# Patient Record
Sex: FEMALE | Race: WHITE | NOT HISPANIC OR LATINO | Employment: FULL TIME | URBAN - NONMETROPOLITAN AREA
[De-identification: names, ages, dates, MRNs, and addresses within clinical notes are randomized per-mention and may not be internally consistent; named-entity substitution may affect disease eponyms.]

---

## 2023-10-17 ENCOUNTER — TELEPHONE (OUTPATIENT)
Dept: PRIMARY CARE | Facility: CLINIC | Age: 48
End: 2023-10-17
Payer: COMMERCIAL

## 2023-10-17 DIAGNOSIS — Z12.31 ENCOUNTER FOR SCREENING MAMMOGRAM FOR MALIGNANT NEOPLASM OF BREAST: Primary | ICD-10-CM

## 2023-10-17 NOTE — TELEPHONE ENCOUNTER
Patient left a message on the referral line requesting a referral for a mammogram, wants to go to [facility]

## 2023-10-31 ENCOUNTER — ANCILLARY PROCEDURE (OUTPATIENT)
Dept: RADIOLOGY | Facility: CLINIC | Age: 48
End: 2023-10-31
Payer: COMMERCIAL

## 2023-10-31 DIAGNOSIS — Z12.31 ENCOUNTER FOR SCREENING MAMMOGRAM FOR MALIGNANT NEOPLASM OF BREAST: ICD-10-CM

## 2023-10-31 PROCEDURE — 77067 SCR MAMMO BI INCL CAD: CPT | Performed by: RADIOLOGY

## 2023-10-31 PROCEDURE — 77063 BREAST TOMOSYNTHESIS BI: CPT

## 2023-10-31 PROCEDURE — 77063 BREAST TOMOSYNTHESIS BI: CPT | Performed by: RADIOLOGY

## 2024-02-06 ASSESSMENT — PROMIS GLOBAL HEALTH SCALE
EMOTIONAL_PROBLEMS: NEVER
RATE_GENERAL_HEALTH: EXCELLENT
CARRYOUT_SOCIAL_ACTIVITIES: VERY GOOD
RATE_QUALITY_OF_LIFE: EXCELLENT
RATE_SOCIAL_SATISFACTION: GOOD
RATE_MENTAL_HEALTH: VERY GOOD
RATE_AVERAGE_PAIN: 0
CARRYOUT_PHYSICAL_ACTIVITIES: COMPLETELY
RATE_PHYSICAL_HEALTH: VERY GOOD

## 2024-02-08 PROBLEM — N85.2 ENLARGED UTERUS: Status: ACTIVE | Noted: 2024-02-08

## 2024-02-08 PROBLEM — C50.919 BREAST CA (MULTI): Status: ACTIVE | Noted: 2024-02-08

## 2024-02-08 PROBLEM — L71.9 ROSACEA, UNSPECIFIED: Status: ACTIVE | Noted: 2021-05-04

## 2024-02-08 PROBLEM — Z90.10 HISTORY OF MASTECTOMY, TOTAL: Status: ACTIVE | Noted: 2024-02-08

## 2024-02-08 PROBLEM — R73.03 PREDIABETES: Status: ACTIVE | Noted: 2024-02-08

## 2024-02-08 PROBLEM — D18.01 HEMANGIOMA OF SKIN AND SUBCUTANEOUS TISSUE: Status: ACTIVE | Noted: 2021-05-04

## 2024-02-08 PROBLEM — C50.919 BREAST CA (MULTI): Chronic | Status: ACTIVE | Noted: 2024-02-08

## 2024-02-08 PROBLEM — R22.40 MASS OF LEG: Status: ACTIVE | Noted: 2024-02-08

## 2024-02-08 PROBLEM — K21.9 GERD (GASTROESOPHAGEAL REFLUX DISEASE): Status: ACTIVE | Noted: 2024-02-08

## 2024-02-08 PROBLEM — E04.1 THYROID NODULE: Status: ACTIVE | Noted: 2024-02-08

## 2024-02-08 PROBLEM — M54.12 CERVICAL RADICULOPATHY: Status: ACTIVE | Noted: 2024-02-08

## 2024-02-08 PROBLEM — D25.9 LEIOMYOMA OF UTERUS: Status: ACTIVE | Noted: 2024-02-08

## 2024-02-08 RX ORDER — OMEPRAZOLE 20 MG/1
CAPSULE, DELAYED RELEASE ORAL
COMMUNITY
Start: 2022-04-26 | End: 2024-02-13 | Stop reason: WASHOUT

## 2024-02-08 RX ORDER — CHOLECALCIFEROL (VITAMIN D3) 125 MCG
CAPSULE ORAL
COMMUNITY

## 2024-02-09 PROBLEM — Z00.00 WELL ADULT EXAM: Chronic | Status: ACTIVE | Noted: 2024-02-09

## 2024-02-13 ENCOUNTER — LAB (OUTPATIENT)
Dept: LAB | Facility: LAB | Age: 49
End: 2024-02-13
Payer: COMMERCIAL

## 2024-02-13 ENCOUNTER — OFFICE VISIT (OUTPATIENT)
Dept: PRIMARY CARE | Facility: CLINIC | Age: 49
End: 2024-02-13
Payer: COMMERCIAL

## 2024-02-13 VITALS
DIASTOLIC BLOOD PRESSURE: 75 MMHG | TEMPERATURE: 98.5 F | OXYGEN SATURATION: 98 % | HEIGHT: 66 IN | SYSTOLIC BLOOD PRESSURE: 121 MMHG | HEART RATE: 65 BPM | BODY MASS INDEX: 29.54 KG/M2 | WEIGHT: 183.8 LBS

## 2024-02-13 DIAGNOSIS — R73.03 PREDIABETES: Chronic | ICD-10-CM

## 2024-02-13 DIAGNOSIS — Z11.59 ENCOUNTER FOR HEPATITIS C SCREENING TEST FOR LOW RISK PATIENT: Chronic | ICD-10-CM

## 2024-02-13 DIAGNOSIS — C50.919 MALIGNANT NEOPLASM OF FEMALE BREAST, UNSPECIFIED ESTROGEN RECEPTOR STATUS, UNSPECIFIED LATERALITY, UNSPECIFIED SITE OF BREAST (MULTI): Chronic | ICD-10-CM

## 2024-02-13 DIAGNOSIS — E66.09 CLASS 1 OBESITY DUE TO EXCESS CALORIES WITH BODY MASS INDEX (BMI) OF 30.0 TO 30.9 IN ADULT, UNSPECIFIED WHETHER SERIOUS COMORBIDITY PRESENT: Chronic | ICD-10-CM

## 2024-02-13 DIAGNOSIS — Z13.220 SCREENING FOR CHOLESTEROL LEVEL: Chronic | ICD-10-CM

## 2024-02-13 DIAGNOSIS — Z90.10 HISTORY OF MASTECTOMY, TOTAL: Chronic | ICD-10-CM

## 2024-02-13 DIAGNOSIS — Z11.4 SCREENING FOR HIV (HUMAN IMMUNODEFICIENCY VIRUS): Chronic | ICD-10-CM

## 2024-02-13 DIAGNOSIS — Z12.11 SCREEN FOR COLON CANCER: Chronic | ICD-10-CM

## 2024-02-13 DIAGNOSIS — L71.9 ROSACEA: Chronic | ICD-10-CM

## 2024-02-13 DIAGNOSIS — Z00.00 WELL ADULT EXAM: Primary | Chronic | ICD-10-CM

## 2024-02-13 PROBLEM — E66.811 CLASS 1 OBESITY DUE TO EXCESS CALORIES WITH BODY MASS INDEX (BMI) OF 30.0 TO 30.9 IN ADULT: Chronic | Status: ACTIVE | Noted: 2024-02-13

## 2024-02-13 LAB
CHOLEST SERPL-MCNC: 175 MG/DL (ref 0–199)
CHOLESTEROL/HDL RATIO: 3.8
EST. AVERAGE GLUCOSE BLD GHB EST-MCNC: 100 MG/DL
HBA1C MFR BLD: 5.1 %
HCV AB SER QL: NONREACTIVE
HDLC SERPL-MCNC: 46.2 MG/DL
HIV 1+2 AB+HIV1 P24 AG SERPL QL IA: NONREACTIVE
LDLC SERPL CALC-MCNC: 101 MG/DL
NON HDL CHOLESTEROL: 129 MG/DL (ref 0–149)
TRIGL SERPL-MCNC: 137 MG/DL (ref 0–149)
VLDL: 27 MG/DL (ref 0–40)

## 2024-02-13 PROCEDURE — 80061 LIPID PANEL: CPT

## 2024-02-13 PROCEDURE — 83036 HEMOGLOBIN GLYCOSYLATED A1C: CPT

## 2024-02-13 PROCEDURE — 86803 HEPATITIS C AB TEST: CPT

## 2024-02-13 PROCEDURE — 99396 PREV VISIT EST AGE 40-64: CPT | Performed by: FAMILY MEDICINE

## 2024-02-13 PROCEDURE — 3008F BODY MASS INDEX DOCD: CPT | Performed by: FAMILY MEDICINE

## 2024-02-13 PROCEDURE — 36415 COLL VENOUS BLD VENIPUNCTURE: CPT

## 2024-02-13 PROCEDURE — 1036F TOBACCO NON-USER: CPT | Performed by: FAMILY MEDICINE

## 2024-02-13 PROCEDURE — 87389 HIV-1 AG W/HIV-1&-2 AB AG IA: CPT

## 2024-04-23 ENCOUNTER — OFFICE VISIT (OUTPATIENT)
Dept: DERMATOLOGY | Facility: CLINIC | Age: 49
End: 2024-04-23
Payer: COMMERCIAL

## 2024-04-23 DIAGNOSIS — L71.9 ROSACEA: Primary | ICD-10-CM

## 2024-04-23 DIAGNOSIS — Z12.83 ENCOUNTER FOR SCREENING FOR MALIGNANT NEOPLASM OF SKIN: ICD-10-CM

## 2024-04-23 DIAGNOSIS — D22.9 MELANOCYTIC NEVUS, UNSPECIFIED LOCATION: ICD-10-CM

## 2024-04-23 DIAGNOSIS — D18.01 HEMANGIOMA OF SKIN: ICD-10-CM

## 2024-04-23 DIAGNOSIS — L81.4 LENTIGO: ICD-10-CM

## 2024-04-23 PROCEDURE — 1036F TOBACCO NON-USER: CPT | Performed by: NURSE PRACTITIONER

## 2024-04-23 PROCEDURE — 99214 OFFICE O/P EST MOD 30 MIN: CPT | Performed by: NURSE PRACTITIONER

## 2024-04-23 PROCEDURE — 3008F BODY MASS INDEX DOCD: CPT | Performed by: NURSE PRACTITIONER

## 2024-04-23 RX ORDER — IVERMECTIN 10 MG/G
CREAM TOPICAL
Qty: 45 G | Refills: 5 | Status: SHIPPED | OUTPATIENT
Start: 2024-04-23

## 2024-04-23 NOTE — PROGRESS NOTES
Subjective     Karol Hutchinson is a 48 y.o. female who presents for the following: Skin Check (Patient presents to office today for FBSE. PMHX insignificant. ).     Review of Systems:  No other skin or systemic complaints other than what is documented elsewhere in the note.    The following portions of the chart were reviewed this encounter and updated as appropriate:   Tobacco  Allergies  Meds  Problems  Med Hx  Surg Hx  Fam Hx         Skin Cancer History  No skin cancer on file.      Specialty Problems          Dermatology Problems    Hemangioma of skin and subcutaneous tissue    Rosacea, unspecified    Rosacea        Objective   Well appearing patient in no apparent distress; mood and affect are within normal limits.    A focused skin examination was performed. All findings within normal limits unless otherwise noted below.    Assessment/Plan   1. Rosacea  Head - Anterior (Face)  Mid face erythema with telangiectasias and scattered inflammatory papules.    -Discussed nature of this chronic condition  -Recommend gentle skin care habits including gentle cleansers, non-comedogenic physical/mineral based sunscreen daily. Avoid exfoliation, wind and extreme temperatures when possible.  - Start Soolantra, use as directed.   - Discussed cosmetic treatment options, will explore her options.   - Risks, benefits, and side effects discussed. Patient understood and agrees with the plan.       Related Medications  ivermectin 1 % cream  Thin coat to entire face once daily.    2. Encounter for screening for malignant neoplasm of skin  Scattered benign lesions    - Protective measures, such as avoiding skin exposure to sunlight during peak sun hours (10 AM to 3 PM), wearing protective clothing, and applying high-SPF sunscreen, are essential for reducing exposure to harmful ultraviolet (UV) light.  - Monthly self-examination of the skin is helpful to detect new lesions or changes in existing lesions.  - Discussed signs  and symptoms of sun-related skin cancers.   - Make sure your moles are not signs of skin cancer (melanoma). Remember the ABCDEs of melanoma lesions:  A - Asymmetry: One half of the lesion does not mirror the other half.  B - Border: The borders are irregular or vague (indistinct).  C - Color: More than one color may be noted within the mole.  D - Diameter: Size greater than 6 mm (roughly the size of a pencil eraser) may be concerning.  E - Evolving: Notable changes in the lesion over time are suspicious signs for skin cancer.    3. Melanocytic nevus, unspecified location  Uniform pigmented macule(s)/papule(s) with reassuring findings on dermoscopy    -Discussed nature of condition  -Reassurance, benign-appearing features on examination today  -Recommend continued observation    4. Hemangioma of skin  Violaceous/red papule with maroon lagoons     - A cherry hemangioma is a small macule (small, flat, smooth area) or papule (small, solid bump) formed from an overgrowth of tiny blood vessels in the skin. Cherry hemangiomas are characteristically red or purplish in color. They often first appear in middle adulthood and usually increase in number with age. Cherry hemangiomas are noncancerous (benign) and are common in adults.  - Lesions are benign, reassured patient.     5. Lentigo  Scattered tan macules in sun-exposed areas.    A solar lentigo (plural, solar lentigines), sometimes called an age spot or liver spot, is a brown macule (small, flat, smooth area of skin) caused by chronic sun or artificial ultraviolet (UV) light exposure. There may be just one lentigo or there may be multiple. This type of lentigo is different from lentigo simplex (discussed separately) because it is caused by exposure to UV light. Solar lentigines are benign, but they do indicate excessive sun exposure, a risk factor for the development of skin cancer.  Lesions are benign, no treatment needed.

## 2024-05-07 ENCOUNTER — APPOINTMENT (OUTPATIENT)
Dept: DERMATOLOGY | Facility: CLINIC | Age: 49
End: 2024-05-07
Payer: COMMERCIAL

## 2024-05-22 ENCOUNTER — APPOINTMENT (OUTPATIENT)
Dept: DERMATOLOGY | Facility: CLINIC | Age: 49
End: 2024-05-22
Payer: COMMERCIAL

## 2024-09-18 ENCOUNTER — TELEPHONE (OUTPATIENT)
Dept: PRIMARY CARE | Facility: CLINIC | Age: 49
End: 2024-09-18
Payer: COMMERCIAL

## 2024-09-18 DIAGNOSIS — Z12.31 ENCOUNTER FOR SCREENING MAMMOGRAM FOR MALIGNANT NEOPLASM OF BREAST: Primary | ICD-10-CM

## 2024-09-18 NOTE — TELEPHONE ENCOUNTER
Patient left a message on the referral line requesting an order for a mammogram, wants STACY Meng or Shoaib

## 2024-10-29 ENCOUNTER — APPOINTMENT (OUTPATIENT)
Dept: OBSTETRICS AND GYNECOLOGY | Facility: CLINIC | Age: 49
End: 2024-10-29
Payer: COMMERCIAL

## 2024-11-05 ENCOUNTER — HOSPITAL ENCOUNTER (OUTPATIENT)
Dept: RADIOLOGY | Facility: CLINIC | Age: 49
Discharge: HOME | End: 2024-11-05
Payer: COMMERCIAL

## 2024-11-05 VITALS — WEIGHT: 183.86 LBS | HEIGHT: 66 IN | BODY MASS INDEX: 29.55 KG/M2

## 2024-11-05 DIAGNOSIS — Z12.31 ENCOUNTER FOR SCREENING MAMMOGRAM FOR MALIGNANT NEOPLASM OF BREAST: ICD-10-CM

## 2024-11-05 PROCEDURE — 77063 BREAST TOMOSYNTHESIS BI: CPT | Performed by: STUDENT IN AN ORGANIZED HEALTH CARE EDUCATION/TRAINING PROGRAM

## 2024-11-05 PROCEDURE — 77067 SCR MAMMO BI INCL CAD: CPT | Performed by: STUDENT IN AN ORGANIZED HEALTH CARE EDUCATION/TRAINING PROGRAM

## 2024-11-05 PROCEDURE — 77067 SCR MAMMO BI INCL CAD: CPT

## 2024-11-11 ENCOUNTER — TELEPHONE (OUTPATIENT)
Dept: PRIMARY CARE | Facility: CLINIC | Age: 49
End: 2024-11-11

## 2024-11-11 NOTE — TELEPHONE ENCOUNTER
The pt is asking what specifically the Bi-RADS Category means. Explained that it is a normal mammogram with reference to a benign spot noted.  The patient states she has had left breast tenderness since the mammogram last Tuesday.  She has not taken anything OTC. The tenderness comes and goes.  She is asking what the provider suggests? Will this eventually go away? Is this concerning?

## 2024-11-11 NOTE — TELEPHONE ENCOUNTER
Patient had questions regarding mammogram     Advised you said negative however she has concerns regarding where it says BI-RADS Category: 2 benign    She is asking if this means she has these spots or not?

## 2024-11-12 ENCOUNTER — APPOINTMENT (OUTPATIENT)
Dept: OBSTETRICS AND GYNECOLOGY | Facility: CLINIC | Age: 49
End: 2024-11-12
Payer: COMMERCIAL

## 2024-11-12 VITALS
WEIGHT: 194 LBS | SYSTOLIC BLOOD PRESSURE: 126 MMHG | DIASTOLIC BLOOD PRESSURE: 82 MMHG | HEIGHT: 67 IN | BODY MASS INDEX: 30.45 KG/M2

## 2024-11-12 DIAGNOSIS — D25.9 UTERINE LEIOMYOMA, UNSPECIFIED LOCATION: ICD-10-CM

## 2024-11-12 DIAGNOSIS — C50.919 MALIGNANT NEOPLASM OF FEMALE BREAST, UNSPECIFIED ESTROGEN RECEPTOR STATUS, UNSPECIFIED LATERALITY, UNSPECIFIED SITE OF BREAST: Primary | Chronic | ICD-10-CM

## 2024-11-12 DIAGNOSIS — N64.4 BREAST PAIN: ICD-10-CM

## 2024-11-12 DIAGNOSIS — Z01.419 ENCOUNTER FOR ANNUAL ROUTINE GYNECOLOGICAL EXAMINATION: Primary | ICD-10-CM

## 2024-11-12 DIAGNOSIS — Z85.3 PERSONAL HISTORY OF BREAST CANCER: ICD-10-CM

## 2024-11-12 PROCEDURE — 99396 PREV VISIT EST AGE 40-64: CPT | Performed by: OBSTETRICS & GYNECOLOGY

## 2024-11-12 PROCEDURE — 1036F TOBACCO NON-USER: CPT | Performed by: OBSTETRICS & GYNECOLOGY

## 2024-11-12 PROCEDURE — 3008F BODY MASS INDEX DOCD: CPT | Performed by: OBSTETRICS & GYNECOLOGY

## 2024-11-12 ASSESSMENT — LIFESTYLE VARIABLES
HOW MANY STANDARD DRINKS CONTAINING ALCOHOL DO YOU HAVE ON A TYPICAL DAY: PATIENT DOES NOT DRINK
HOW OFTEN DO YOU HAVE SIX OR MORE DRINKS ON ONE OCCASION: NEVER
AUDIT-C TOTAL SCORE: 0
HOW OFTEN DO YOU HAVE A DRINK CONTAINING ALCOHOL: NEVER
SKIP TO QUESTIONS 9-10: 1

## 2024-11-12 ASSESSMENT — PATIENT HEALTH QUESTIONNAIRE - PHQ9
2. FEELING DOWN, DEPRESSED OR HOPELESS: NOT AT ALL
1. LITTLE INTEREST OR PLEASURE IN DOING THINGS: NOT AT ALL
SUM OF ALL RESPONSES TO PHQ9 QUESTIONS 1 & 2: 0

## 2024-11-12 ASSESSMENT — SOCIAL DETERMINANTS OF HEALTH (SDOH)
WITHIN THE LAST YEAR, HAVE YOU BEEN KICKED, HIT, SLAPPED, OR OTHERWISE PHYSICALLY HURT BY YOUR PARTNER OR EX-PARTNER?: NO
WITHIN THE LAST YEAR, HAVE YOU BEEN AFRAID OF YOUR PARTNER OR EX-PARTNER?: NO
WITHIN THE LAST YEAR, HAVE TO BEEN RAPED OR FORCED TO HAVE ANY KIND OF SEXUAL ACTIVITY BY YOUR PARTNER OR EX-PARTNER?: NO
WITHIN THE LAST YEAR, HAVE YOU BEEN HUMILIATED OR EMOTIONALLY ABUSED IN OTHER WAYS BY YOUR PARTNER OR EX-PARTNER?: NO

## 2024-11-12 NOTE — TELEPHONE ENCOUNTER
Pt is aware. She was seen by GYN and and they gave her a referral to a Breast specialist. She wanted to thank Alma for all her help.

## 2024-11-12 NOTE — PROGRESS NOTES
Subjective   Patient ID: Karol Hutchinson is a 49 y.o. female who presents for Annual Exam and Fibroids.  HPI  Patient is a 49-year-old  1 para 1 whose had 1 spontaneous vaginal delivery.  Her last menstrual period was  she said they are typically 1 month apart sometimes come 3 to 4 days early she will bleed for about 3 to 7 days and currently does not use or need anything for birth control.  Known history of breast cancer status post bilateral mastectomies with TRAM flap reconstruction.  Recent mammogram with normal findings however patient does complain of discomfort in left breast.  Known history of large pedunculated fibroid.  Ultrasound  2310 cm pedunculated fibroid which has been increased in size by 2 cm from previous ultrasound.  Now complaining of some low pelvic pain that that comes and goes but does stick around for several hours and quite achy.  Review of Systems    Objective   Physical Exam  Gen.: Alert and in no acute distress. Well-developed, well-nourished.  Thyroid: Nonenlarged and no palpable thyroid nodules  Cardiovascular: Heart regular rate and rhythm  Pulmonary: Clear bilateral breath sounds  Breasts: Bilateral mastectomies with TRAM flap reconstruction.  No obvious masses or lymphadenopathy noted.  Abdomen: Soft and nontender, no abdominal mass palpated, no organomegaly   Pelvic: External genitalia normal, vagina normal rugated good tone cervix is clean uterus enlarged approximately 16 weeks size somewhat irregular with dominant mass in right quadrant consistent with pedunculated fibroid.  Mildly tender without rebound or guarding.    Assessment/Plan   Annual GYN exam, Pap and HPV not needed this year, will refer to breast specialist for breast pain has not seen breast specialist in several years.  Now with low pelvic pain.  Known history of fibroid uterus will repeat ultrasound and proceed accordingly.         Alvin Wong MD 24 10:23 AM

## 2024-11-26 ENCOUNTER — HOSPITAL ENCOUNTER (OUTPATIENT)
Dept: RADIOLOGY | Facility: CLINIC | Age: 49
Discharge: HOME | End: 2024-11-26
Payer: COMMERCIAL

## 2024-11-26 DIAGNOSIS — D25.9 UTERINE LEIOMYOMA, UNSPECIFIED LOCATION: ICD-10-CM

## 2024-11-26 PROCEDURE — 76830 TRANSVAGINAL US NON-OB: CPT | Performed by: RADIOLOGY

## 2024-11-26 PROCEDURE — 76856 US EXAM PELVIC COMPLETE: CPT | Performed by: RADIOLOGY

## 2024-11-26 PROCEDURE — 76830 TRANSVAGINAL US NON-OB: CPT

## 2024-11-26 NOTE — PROGRESS NOTES
History Of Present Illness :  Karol Hutchinson is a 49 y.o. female who presents on referral from Dr. Alvin Wong for a breast evaluation.    I do not have any medical records to corroborate, but the patient has a history of left breast cancer stage II diagnosed in .  She was treated at [facility] in [facility].  She underwent neoadjuvant chemotherapy followed by bilateral mastectomy with TRAM flap.  The patient noted she had 12 lymph nodes removed from the left axilla, and had a lymph node biopsy on the right side.  She did have additional adjuvant therapy postoperatively.  She notes that she did not take hormonal therapy postoperatively.    Since then she has done quite well.  Despite the fact that she had mastectomies and TRAM flaps, the patient has been obtaining yearly mammograms.    The patient's recent imaging studies of the reconstructed breast were personally reviewed-report and images.  These were bilateral digital screening mammograms with tomosynthesis on 10/31/2023 and 2024.  These as expected were completely negative.  The tissue is fatty replaced.  There were no suspicious densities or microcalcifications.    The patient did note that she had some transient left breast pain following this years mammography, which is unusual for her.  She is also concerned about the benign or BI-RADS 2 impression of the mammography.    Patient does have a family history of breast cancer with a maternal aunt x 2, and a maternal first cousin x 1.  These relatives all ultimately  from metastatic carcinoma.    Patient did have genetic testing at the time of her diagnosis and this was negative for BRCA1.    Patient is single and lives in [place].  She works at [employer].    Past Medical History   Past Medical History:   Diagnosis Date    BRCA1 negative 2006    Breast cancer (Multi) 2006    Disorder of the skin and subcutaneous tissue, unspecified 2021    Skin lesion     Encounter for screening for diabetes mellitus 12/17/2019    Diabetes mellitus screening    Encounter for screening for malignant neoplasm of vagina     Vaginal Pap smear    Fibroid 2022    Hx antineoplastic chemo April 2006    Localized enlarged lymph nodes 12/04/2018    Cervical lymphadenopathy    Localized enlarged lymph nodes 06/29/2016    Lymphadenopathy, submandibular    Localized enlarged lymph nodes 03/07/2014    Cervical adenopathy    Other specified disorders of ear, unspecified ear 04/27/2021    Ear pressure    Other specified disorders of ear, unspecified ear 04/27/2021    Ear congestion    Personal history of malignant neoplasm of breast     Personal history of malignant neoplasm of breast    Personal history of malignant neoplasm of breast 04/02/2015    History of malignant neoplasm of breast    Personal history of other diseases of the nervous system and sense organs     History of migraine    Personal history of other diseases of the nervous system and sense organs 04/27/2021    History of ear pain    Personal history of other diseases of the respiratory system 04/09/2020    History of paranasal sinus congestion    Personal history of other medical treatment     History of mammogram    Personal history of other specified conditions 05/19/2021    History of vertigo        Surgical History    Past Surgical History:   Procedure Laterality Date    LYMPHADENECTOMY  03/07/2014    Lymphadenectomy    MASTECTOMY Bilateral 08/14/2006    Breast Surgery Mastectomy    OTHER SURGICAL HISTORY  03/07/2014    Breast Surgery Reconstruction With TRAM        Allergies   Allergies   Allergen Reactions    Ibuprofen Swelling    Morphine Hives, Itching and Unknown        Home Meds  Current Outpatient Medications   Medication Instructions    ivermectin 1 % cream Thin coat to entire face once daily.        Family History    Family History   Problem Relation Name Age of Onset    Breast cancer Mother's Sister      Breast  cancer Mother's Sister      Breast cancer Maternal Cousin      Atrial fibrillation Mother Carol Hutchinson     Heart disease Mother Carol Hutchinson     Heart disease Paternal Grandfather Brenda Hutchinson     Atrial fibrillation Brother Cruzito Hutchinson     Heart disease Brother Cruzito Hutchinson     Breast cancer Mother's Sister Vero     Breast cancer Mother's Sister Alexus         Social History  Social History     Tobacco Use    Smoking status: Never    Smokeless tobacco: Never   Vaping Use    Vaping status: Never Used   Substance Use Topics    Alcohol use: Not Currently    Drug use: Never        Review Of Systems    Review of Systems    General: Not Present- Obesity, Cancer, HIV, MRSA, Recent Cold/Flu, Tired During the Day and VRE.  HEENT: Not Present- Migraine, Cataracts, Glaucoma, Macular Degeneration and Retinal Detachment.  Respiratory: Not Present- Asthma, Chronic Cough, Difficulty Breathing on Exertion, Difficulty Breathing at Rest, Emphysema, Frequent Bronchitis, Home CPAP/BiPAP, Home Oxygen, Pulmonary Embolus, Pneumonia/TB, Sleep Apnea and Snoring.  Cardiovascular: Not Present- Chest Pain, Congestive Heart Failure, Heart Attack, Coronary Artery Disease, Heart Stent, High Cholesterol/Lipids, Hypertension, Internal Defibrillator, Irregular Heart Beat, Mitral Valve Prolapse, Murmur, Pacemaker and Peripheral Vascular Disease.  Gastrointestinal: Not Present- Heartburn, Hepatitis, Hiatal Hernia, Jaundice, Stomach Ulcer and IBS.  Female Genitourinary: Not Present- Kidney Failure, Kidney Stones, Dialysis and Urinary Tract Infection.  Musculoskeletal: Not Present- Arthritis, Back Pain and Fibromyalgia.  Neurological: Not Present- Headaches, Numbness, Tingling, Seizures, Stroke,  Shunt and Weakness.  Psychiatric: Not Present- Anxiety, Bipolar, Depression and Panic Attacks.  Endocrine: Not Present- Diabetes, Hyperthyroidism, Hypothyroidism and Low Blood Sugar.  Hematology: Not Present- Abnormal Bleeding, Anemia and  Blood Clots.    Vitals  There were no vitals taken for this visit.     Physical Exam   Breast Physical Exam    General  Mental Status - Alert. General Appearance - Not Anxious, in acute distress or Sickly. Orientation - Oriented X3. Build  & Nutrition - Well nourished. Posture - Normal posture. Gait - Normal. Hydration - Well hydrated. Voice - Normal.    Integumentary  Global Assessment: Upon inspection and palpation of skin surfaces of the - Head/Face: no rashes, ulcers, lesions or evidence of photo damage. No palpable nodules or masses, Neck: no visible lesions or palpable masses, Chest: no  swelling,scarring or lesions. No prominent arteries or veins observed and Abdomen: no scars, rashes or lesions.    Chest wall examination:  The patient examined in upright and supine  The patient has symmetric moderately large reconstructed breast; TRAM flap scars noted; transverse right axillary scar; right upper chest subclavicular scar from previous port; skin is normal bilaterally; reconstructed nipple areolar complexes normal; there are no discrete or dominant masses bilaterally    Lymphatic  Head & Neck  General Head & Neck Lymphatics:  Bilateral: Description - No Localized lymphadenopathy. Overlying skin - Normal.  Axillary  General Axillary Region:  Bilateral: Description - No Localized lymphadenopathy. Tenderness - Non Tender.    Abdomen:    Assessment/Plan     Ms. Pierce's clinical follow-up is normal and satisfactory.    She has a history of left breast cancer as noted.    Recommendations:    I reassured the patient    Etiology of her recent painful mammography is not clear although this is not unusual; this may be secondary to scar tissue    Recommend monthly self exams with regard to her reconstructed breast, and the axillae bilaterally    I noted to her that routine mammography in reconstructed breasts following mastectomy is not typically recommended; there is no utility or data to suggest that this is  beneficial    Patient will continue to follow-up with Dr. Wong for yearly examinations    Follow-up with me as needed

## 2024-11-27 ENCOUNTER — OFFICE VISIT (OUTPATIENT)
Dept: SURGERY | Facility: CLINIC | Age: 49
End: 2024-11-27
Payer: COMMERCIAL

## 2024-11-27 VITALS
HEART RATE: 73 BPM | BODY MASS INDEX: 29.51 KG/M2 | WEIGHT: 188 LBS | TEMPERATURE: 97.6 F | DIASTOLIC BLOOD PRESSURE: 85 MMHG | HEIGHT: 67 IN | RESPIRATION RATE: 18 BRPM | SYSTOLIC BLOOD PRESSURE: 134 MMHG | OXYGEN SATURATION: 97 %

## 2024-11-27 DIAGNOSIS — Z85.3 PERSONAL HISTORY OF BREAST CANCER: ICD-10-CM

## 2024-11-27 DIAGNOSIS — Z85.3 HISTORY OF LEFT BREAST CANCER: Primary | ICD-10-CM

## 2024-11-27 PROCEDURE — 1036F TOBACCO NON-USER: CPT | Performed by: SURGERY

## 2024-11-27 PROCEDURE — 3008F BODY MASS INDEX DOCD: CPT | Performed by: SURGERY

## 2024-11-27 PROCEDURE — 99204 OFFICE O/P NEW MOD 45 MIN: CPT | Performed by: SURGERY

## 2024-11-27 PROCEDURE — 99214 OFFICE O/P EST MOD 30 MIN: CPT | Performed by: SURGERY

## 2024-11-27 SDOH — ECONOMIC STABILITY: FOOD INSECURITY: WITHIN THE PAST 12 MONTHS, YOU WORRIED THAT YOUR FOOD WOULD RUN OUT BEFORE YOU GOT MONEY TO BUY MORE.: NEVER TRUE

## 2024-11-27 SDOH — ECONOMIC STABILITY: FOOD INSECURITY: WITHIN THE PAST 12 MONTHS, THE FOOD YOU BOUGHT JUST DIDN'T LAST AND YOU DIDN'T HAVE MONEY TO GET MORE.: NEVER TRUE

## 2024-11-27 ASSESSMENT — COLUMBIA-SUICIDE SEVERITY RATING SCALE - C-SSRS
2. HAVE YOU ACTUALLY HAD ANY THOUGHTS OF KILLING YOURSELF?: NO
6. HAVE YOU EVER DONE ANYTHING, STARTED TO DO ANYTHING, OR PREPARED TO DO ANYTHING TO END YOUR LIFE?: NO
1. IN THE PAST MONTH, HAVE YOU WISHED YOU WERE DEAD OR WISHED YOU COULD GO TO SLEEP AND NOT WAKE UP?: NO

## 2024-11-27 ASSESSMENT — ENCOUNTER SYMPTOMS
LOSS OF SENSATION IN FEET: 0
DEPRESSION: 0
OCCASIONAL FEELINGS OF UNSTEADINESS: 0

## 2024-11-27 ASSESSMENT — PAIN SCALES - GENERAL: PAINLEVEL_OUTOF10: 0-NO PAIN

## 2025-02-14 PROBLEM — N85.2 ENLARGED UTERUS: Status: RESOLVED | Noted: 2024-02-08 | Resolved: 2025-02-14

## 2025-02-14 PROBLEM — D18.01 HEMANGIOMA OF SKIN AND SUBCUTANEOUS TISSUE: Status: RESOLVED | Noted: 2021-05-04 | Resolved: 2025-02-14

## 2025-02-14 PROBLEM — R73.03 PREDIABETES: Chronic | Status: ACTIVE | Noted: 2024-02-08

## 2025-02-14 PROBLEM — Z90.10 HISTORY OF MASTECTOMY, TOTAL: Chronic | Status: ACTIVE | Noted: 2024-02-08

## 2025-02-14 PROBLEM — M54.12 CERVICAL RADICULOPATHY: Status: RESOLVED | Noted: 2024-02-08 | Resolved: 2025-02-14

## 2025-02-14 PROBLEM — L71.9 ROSACEA, UNSPECIFIED: Status: RESOLVED | Noted: 2021-05-04 | Resolved: 2025-02-14

## 2025-02-14 PROBLEM — R22.40 MASS OF LEG: Status: RESOLVED | Noted: 2024-02-08 | Resolved: 2025-02-14

## 2025-02-14 PROBLEM — N64.4 BREAST PAIN: Status: RESOLVED | Noted: 2024-11-12 | Resolved: 2025-02-14

## 2025-02-14 NOTE — PROGRESS NOTES
"  Subjective      HPI:          Karol Hutchinson is a 49 y.o. female 49 y.o. is here today for PE/health maintenance      Chief Complaint   Patient presents with    Annual Exam           Pt also due for f/up chronic medical problems-    Pt wants to discuss vertigo sxs she has had for the last two weeks-  Indigestion sxs on the left side in the chest- does not feel heart related but has family hx has taken Pepto     No SOB  No CP  Feels like a \"bubble\" caught   Increase belching  No sweating  No palps             Eats fast /swallows air     No change with activity     Brother hypertrophic cardiomyopathy - age 32     Mother Afib, CAD-  73     Vertigo- positional- worse with movement to the right- pressure in ears   History PT in the past - trying home maneuvers - not helping     Epigastric burning , on and off          Colonoscopy was earned - has pt had this test done?    3/29/24- under Media file- Dr Avendaño         Due for labs       Health maintenance:   And General guidelines     TDAP--   Pap - screening for cervical cancer-  21-29 years old: Get a Pap test every three years   30-65 years old: Get a Pap test and an HPV test (co-test) every five years, or a Pap test alone every three years   65 years or older: Do not need screening if you have no history of cervical changes and have had three negative Pap tests in a row, two negative HPV tests in a row, or two negative co-test results in a row within the past 10 years   <25 yrs - GC/chlamydia screen  Mammo- (40-74)  DXA- (>65 yrs, q2yrs) -   Hepatitis C Screen- (18-79, once in a lifetime) -   HIV screen (15-65, once in a lifetime) )-  Screen diabetes--  Pre-DM-- HbA1c:   Screen Lipid Panel-- ratio:   Influenza--  Colonoscopy (45-75)--   Shingrix (>50)-  Pneumonia series (>50)-  CT cardiac Score (40-70)-- if indicated  LDLCT- (50-80 yrs with 20 pk, yr history , current or quit within 15 yrs)             Other medical specialists are involved in " patient's care.    Any recent notes that were available were reviewed.    All conditions are monitored, evaluated and assessed regularly.    Patient is compliant with current treatment regimen/medications.    Specialists involved include:      Dr Wong- PN 11/12/24      Dr Diggs- history breast cancer     Dr Avendaño- GI       Health Maintenance Topic       Topic Date     Colorectal Cancer Screening Dr Avendaño  2024     Cervical Cancer Screening- Dr Wong  02/01/2025     Health Maintenance Topics with due status: Not Due       Topic Last Completion Date    DTaP/Tdap/Td Vaccines 01/05/2021    Yearly Adult Physical today     Health Maintenance Topics with due status: Completed       Topic Last Completion Date    MMR Vaccines 02/14/2011    Hepatitis B Vaccines 04/02/2019    HIV Screening 02/13/2024    Hepatitis C Screening 02/13/2024                     Immunization History   Administered Date(s) Administered    Hepatitis B vaccine, 18yrs and older(HEPLISAV) 11/27/2018, 04/02/2019    Hepatitis B vaccine, adult *Check Product/Dose* 09/25/2018    MMR vaccine, subcutaneous (MMR II) 02/14/2011    Tdap vaccine, age 7 year and older (BOOSTRIX, ADACEL) 05/04/2010, 01/05/2021         Social History     Tobacco Use   Smoking Status Never   Smokeless Tobacco Never                   Social History     Substance and Sexual Activity   Alcohol Use Not Currently          No visits with results within 12 Month(s) from this visit.   Latest known visit with results is:   Lab on 02/13/2024   Component Date Value Ref Range Status    Cholesterol 02/13/2024 175  0 - 199 mg/dL Final          Age      Desirable   Borderline High   High     0-19 Y     0 - 169       170 - 199     >/= 200    20-24 Y     0 - 189       190 - 224     >/= 225         >24 Y     0 - 199       200 - 239     >/= 240   **All ranges are based on fasting samples. Specific   therapeutic targets will vary based on patient-specific   cardiac risk.    Pediatric guidelines  reference:Pediatrics 2011, 128(S5).Adult guidelines reference: NCEP ATPIII Guidelines,LEANDRO 2001, 258:2486-    Venipuncture immediately after or during the administration of Metamizole may lead to falsely low results. Testing should be performed immediately prior to Metamizole dosing.    HDL-Cholesterol 02/13/2024 46.2  mg/dL Final      Age       Very Low   Low     Normal    High    0-19 Y    < 35      < 40     40-45     ----  20-24 Y    ----     < 40      >45      ----        >24 Y      ----     < 40     40-60      >60      Cholesterol/HDL Ratio 02/13/2024 3.8   Final      Ref Values  Desirable  < 3.4  High Risk  > 5.0    LDL Calculated 02/13/2024 101 (H)  <=99 mg/dL Final                                Near   Borderline      AGE      Desirable  Optimal    High     High     Very High     0-19 Y     0 - 109     ---    110-129   >/= 130     ----    20-24 Y     0 - 119     ---    120-159   >/= 160     ----      >24 Y     0 -  99   100-129  130-159   160-189     >/=190      VLDL 02/13/2024 27  0 - 40 mg/dL Final    Triglycerides 02/13/2024 137  0 - 149 mg/dL Final       Age         Desirable   Borderline High   High     Very High   0 D-90 D    19 - 174         ----         ----        ----  91 D- 9 Y     0 -  74        75 -  99     >/= 100      ----    10-19 Y     0 -  89        90 - 129     >/= 130      ----    20-24 Y     0 - 114       115 - 149     >/= 150      ----         >24 Y     0 - 149       150 - 199    200- 499    >/= 500    Venipuncture immediately after or during the administration of Metamizole may lead to falsely low results. Testing should be performed immediately prior to Metamizole dosing.    Non HDL Cholesterol 02/13/2024 129  0 - 149 mg/dL Final          Age       Desirable   Borderline High   High     Very High     0-19 Y     0 - 119       120 - 144     >/= 145    >/= 160    20-24 Y     0 - 149       150 - 189     >/= 190      ----         >24 Y    30 mg/dL above LDL Cholesterol goal       "Hepatitis C AB 02/13/2024 Nonreactive  Nonreactive Final    Results from patients taking biotin supplements or receiving high-dose biotin therapy should be interpreted with caution due to possible interference with this test. Providers may contact their local laboratory for further information.    HIV 1/2 Antigen/Antibody Screen wi* 02/13/2024 Nonreactive  Nonreactive Final    Hemoglobin A1C 02/13/2024 5.1  see below % Final    Estimated Average Glucose 02/13/2024 100  Not Established mg/dL Final                 The following portions of the patient's chart were reviewed in this encounter and updated as appropriate:  Tobacco  Allergies  Meds  Problems  Med Hx  Surg Hx       Family History   Problem Relation Name Age of Onset    Breast cancer Mother's Sister      Breast cancer Mother's Sister      Breast cancer Maternal Cousin      Atrial fibrillation Mother Carol Hutchinson     Heart disease Mother Carol Hutchinson     Heart disease Paternal Grandfather Brenda Hutchinson     Atrial fibrillation Brother Cruzito Hutchinson     Heart disease Brother Cruzito Hutchinson     Breast cancer Mother's Sister Vero     Breast cancer Mother's Sister Alexus          Cancer-related family history includes Breast cancer in her maternal cousin, mother's sister, mother's sister, mother's sister, and mother's sister.      The 10-year ASCVD risk score (Timothy HER, et al., 2019) is: 1.2%    Values used to calculate the score:      Age: 49 years      Sex: Female      Is Non- : No      Diabetic: No      Tobacco smoker: No      Systolic Blood Pressure: 138 mmHg      Is BP treated: No      HDL Cholesterol: 46.2 mg/dL      Total Cholesterol: 175 mg/dL        Review of Systems      Review of Systems        Objective        PE:          Visit Vitals  /86 (BP Location: Right arm, Patient Position: Sitting, BP Cuff Size: Adult)   Pulse 96   Temp 35.7 °C (96.3 °F) (Temporal)   Resp 12   Ht 1.651 m (5' 5\")   Wt 86.1 kg " (189 lb 12.8 oz)   LMP 02/05/2025 (Exact Date)   SpO2 97%   BMI 31.58 kg/m²   OB Status Having periods   Smoking Status Never   BSA 1.99 m²                    Pt is A and O x3, NAD  Head- normocephalic and atraumatic,   EYES- conjunctiva- normal   lids- normal  EARS/NOSE- TM's normal, nasopharynx- normal and atraumatic  OROPHARYNX- normal  NECK- supple, FROM  THYROID- NT, normal size, no nodule noted  LYMPH- no cervical lymph nodes palpated   CV- RRR without murmur  PULM- CTA bilaterally, normal respiratory effort  RESPIRATORY EFFORT- normal , no retractions or nasal flaring   ABD- normoactive BS's , soft , NT, no hepatosplenomegaly palpated  EXT- no edema,NT  SKIN- no abnormal skin lesions noted  NEURO- no focal deficits  PSYCH- pleasant, normal judgement and insight    BP Readings from Last 3 Encounters:   02/18/25 138/86   11/27/24 134/85   11/12/24 126/82         Wt Readings from Last 3 Encounters:   02/18/25 86.1 kg (189 lb 12.8 oz)   11/27/24 85.3 kg (188 lb)   11/12/24 88 kg (194 lb)         BMI Readings from Last 3 Encounters:   02/18/25 31.58 kg/m²   11/27/24 29.44 kg/m²   11/12/24 30.38 kg/m²       The number and complexity of problems addressed is considered moderate.  The amount and/or complexity of data reviewed and analyzed is considered moderate. The risk of complications and/or morbidity/mortality of patient is considered moderate. Overall, this patient encounter is considered a moderate risk visit.        Assessment/Plan      Assessment & Plan  Well adult exam    Orders:    Follow Up In [facility] - PCP - Established    History of mastectomy, total         Prediabetes    Orders:    Hemoglobin A1C; Future    Screening for cholesterol level    Orders:    Lipid Panel; Future    Vertigo    Orders:    meclizine (Antivert) 25 mg tablet; Take 1 tablet (25 mg) by mouth 3 times a day as needed for dizziness for up to 30 doses.    Referral to Physical Therapy; Future    Gastroesophageal reflux  disease, unspecified whether esophagitis present    Orders:    omeprazole (PriLOSEC) 40 mg DR capsule; Take 1 capsule (40 mg) by mouth once daily in the morning. Take before meals. Do not crush or chew.    Chest pain, unspecified type    Orders:    ECG 12 Lead    Transthoracic Echo (TTE) Complete; Future           Consider refer cardiology also If No Better     Follow up 6-8 weeks- follow up GERD            Recommendations for women annual wellness exam:   Make sure screenings for cervical and breast cancer are up to date if applicable- pap smears age 21-65  mammogram starting at age 35- 40 or sooner if positive family history of breast cancer   colonoscopy at age 45, earlier if positive family history for breast or colon cancer   Screen for osteoporosis with DXA bone scan starting at age 65 or sooner if risk factors present (long term steroid use, smoking, heavy alcohol use, history of fracture, rheumatoid arthritis, low body weight, family history of hip fracture)  Screening for lung cancer with low-dose CT in all adults age 50-80 who have a 20 pack-year smoking history and currently smoke or have quit within the past 15 years; and are symptom free/no prior pulmonary diagnosis  Gardisil vaccine age 9-26 years of age   Follow a healthy diet (Examples, Dash diet, Mediterranean diet)  Exercise 150 minutes per week   Maintain healthy weight (BMI < 25)  Do not smoke   Alcohol in moderation (up to 1 drink/day)  Get enough sleep (7-8 hours/night)  Take a prenatal vitamin with folic acid if possibility of pregnancy   Make sure immunizations are up to date   Recommend minimum 1,000 mg calcium and 600-800 IU vitamin D daily (combination of diet + supplement)- unless there is a contraindication   Visit dentist twice yearly      If you are less than 60 years old, have diabetes mellitus, or chronic kidney disease, your goal blood pressure is < 140/90.  If you are older than 60 years old and do not have diabetes or kidney  disease, your goal blood pressure is < 150/90.

## 2025-02-18 ENCOUNTER — APPOINTMENT (OUTPATIENT)
Dept: PRIMARY CARE | Facility: CLINIC | Age: 50
End: 2025-02-18
Payer: COMMERCIAL

## 2025-02-18 ENCOUNTER — OFFICE VISIT (OUTPATIENT)
Dept: PRIMARY CARE | Facility: CLINIC | Age: 50
End: 2025-02-18
Payer: COMMERCIAL

## 2025-02-18 VITALS
TEMPERATURE: 96.3 F | HEIGHT: 65 IN | HEART RATE: 96 BPM | BODY MASS INDEX: 31.62 KG/M2 | WEIGHT: 189.8 LBS | OXYGEN SATURATION: 97 % | DIASTOLIC BLOOD PRESSURE: 86 MMHG | SYSTOLIC BLOOD PRESSURE: 138 MMHG | RESPIRATION RATE: 12 BRPM

## 2025-02-18 DIAGNOSIS — R94.31 ABNORMAL EKG: Chronic | ICD-10-CM

## 2025-02-18 DIAGNOSIS — Z13.220 SCREENING FOR CHOLESTEROL LEVEL: Chronic | ICD-10-CM

## 2025-02-18 DIAGNOSIS — R42 VERTIGO: Chronic | ICD-10-CM

## 2025-02-18 DIAGNOSIS — Z90.10 HISTORY OF MASTECTOMY, TOTAL: Chronic | ICD-10-CM

## 2025-02-18 DIAGNOSIS — R07.9 CHEST PAIN, UNSPECIFIED TYPE: Chronic | ICD-10-CM

## 2025-02-18 DIAGNOSIS — K21.9 GASTROESOPHAGEAL REFLUX DISEASE, UNSPECIFIED WHETHER ESOPHAGITIS PRESENT: Chronic | ICD-10-CM

## 2025-02-18 DIAGNOSIS — Z00.00 WELL ADULT EXAM: Primary | Chronic | ICD-10-CM

## 2025-02-18 DIAGNOSIS — R73.03 PREDIABETES: Chronic | ICD-10-CM

## 2025-02-18 PROCEDURE — 99214 OFFICE O/P EST MOD 30 MIN: CPT | Performed by: FAMILY MEDICINE

## 2025-02-18 PROCEDURE — 3008F BODY MASS INDEX DOCD: CPT | Performed by: FAMILY MEDICINE

## 2025-02-18 PROCEDURE — 93000 ELECTROCARDIOGRAM COMPLETE: CPT | Performed by: FAMILY MEDICINE

## 2025-02-18 PROCEDURE — 99396 PREV VISIT EST AGE 40-64: CPT | Performed by: FAMILY MEDICINE

## 2025-02-18 PROCEDURE — 1036F TOBACCO NON-USER: CPT | Performed by: FAMILY MEDICINE

## 2025-02-18 RX ORDER — OMEPRAZOLE 40 MG/1
40 CAPSULE, DELAYED RELEASE ORAL
Qty: 30 CAPSULE | Refills: 1 | Status: SHIPPED | OUTPATIENT
Start: 2025-02-18 | End: 2025-04-19

## 2025-02-18 RX ORDER — MECLIZINE HYDROCHLORIDE 25 MG/1
25 TABLET ORAL 3 TIMES DAILY PRN
Qty: 30 TABLET | Refills: 0 | Status: SHIPPED | OUTPATIENT
Start: 2025-02-18

## 2025-02-18 ASSESSMENT — PATIENT HEALTH QUESTIONNAIRE - PHQ9
1. LITTLE INTEREST OR PLEASURE IN DOING THINGS: NOT AT ALL
SUM OF ALL RESPONSES TO PHQ9 QUESTIONS 1 AND 2: 0
2. FEELING DOWN, DEPRESSED OR HOPELESS: NOT AT ALL

## 2025-02-18 NOTE — ASSESSMENT & PLAN NOTE
Orders:    meclizine (Antivert) 25 mg tablet; Take 1 tablet (25 mg) by mouth 3 times a day as needed for dizziness for up to 30 doses.    Referral to Physical Therapy; Future

## 2025-02-19 LAB
CHOLEST SERPL-MCNC: 247 MG/DL
CHOLEST/HDLC SERPL: 4.7 (CALC)
EST. AVERAGE GLUCOSE BLD GHB EST-MCNC: 108 MG/DL
EST. AVERAGE GLUCOSE BLD GHB EST-SCNC: 6 MMOL/L
HBA1C MFR BLD: 5.4 % OF TOTAL HGB
HDLC SERPL-MCNC: 53 MG/DL
LDLC SERPL CALC-MCNC: 158 MG/DL (CALC)
NONHDLC SERPL-MCNC: 194 MG/DL (CALC)
TRIGL SERPL-MCNC: 197 MG/DL

## 2025-03-04 ENCOUNTER — APPOINTMENT (OUTPATIENT)
Dept: RADIOLOGY | Facility: HOSPITAL | Age: 50
End: 2025-03-04
Payer: COMMERCIAL

## 2025-03-04 ENCOUNTER — APPOINTMENT (OUTPATIENT)
Dept: CARDIOLOGY | Facility: HOSPITAL | Age: 50
End: 2025-03-04
Payer: COMMERCIAL

## 2025-03-04 ENCOUNTER — HOSPITAL ENCOUNTER (OUTPATIENT)
Dept: RADIOLOGY | Facility: HOSPITAL | Age: 50
End: 2025-03-04
Payer: COMMERCIAL

## 2025-03-11 ENCOUNTER — APPOINTMENT (OUTPATIENT)
Dept: CARDIOLOGY | Facility: HOSPITAL | Age: 50
End: 2025-03-11
Payer: COMMERCIAL

## 2025-03-11 ENCOUNTER — APPOINTMENT (OUTPATIENT)
Dept: RADIOLOGY | Facility: HOSPITAL | Age: 50
End: 2025-03-11
Payer: COMMERCIAL

## 2025-03-11 DIAGNOSIS — R42 VERTIGO: ICD-10-CM

## 2025-03-11 RX ORDER — MECLIZINE HYDROCHLORIDE 25 MG/1
25 TABLET ORAL 3 TIMES DAILY PRN
Qty: 30 TABLET | Refills: 0 | Status: SHIPPED | OUTPATIENT
Start: 2025-03-11

## 2025-03-14 ENCOUNTER — APPOINTMENT (OUTPATIENT)
Dept: RADIOLOGY | Facility: HOSPITAL | Age: 50
End: 2025-03-14
Payer: COMMERCIAL

## 2025-03-14 ENCOUNTER — APPOINTMENT (OUTPATIENT)
Dept: CARDIOLOGY | Facility: HOSPITAL | Age: 50
End: 2025-03-14
Payer: COMMERCIAL

## 2025-03-17 ENCOUNTER — TELEPHONE (OUTPATIENT)
Dept: PRIMARY CARE | Facility: CLINIC | Age: 50
End: 2025-03-17
Payer: COMMERCIAL

## 2025-03-17 DIAGNOSIS — R94.31 ABNORMAL EKG: ICD-10-CM

## 2025-03-17 DIAGNOSIS — R07.9 CHEST PAIN, UNSPECIFIED TYPE: Primary | ICD-10-CM

## 2025-03-17 DIAGNOSIS — Z82.49 FAMILY HISTORY OF CARDIAC DISORDER: ICD-10-CM

## 2025-03-17 NOTE — TELEPHONE ENCOUNTER
Paula Paez MD  Good morning    Your patient Karol Hutchinson is scheduled for a nuclear stress test. The patients insurance has denied this request. We can't approve since we don't know that you have heart disease. You also have a low risk of heart disease. Lastly, your calcium score is not high enough.    To complete a peer to peer please call [phone] and reference case [id number].    Thanks for your help with this patient.    Paula

## 2025-03-18 ENCOUNTER — TELEPHONE (OUTPATIENT)
Dept: PRIMARY CARE | Facility: CLINIC | Age: 50
End: 2025-03-18
Payer: COMMERCIAL

## 2025-03-18 NOTE — TELEPHONE ENCOUNTER
This is in regards to the Stress Test that is not covered     Karol MALLORY Do Sharon1 [employer] Clinical Support Staff (supporting You)34 minutes ago (1:45 PM)       Hi Dr. Paez and Brooke. I’m sorry we have been playing phone tag.   I did see your message and referral. I’m assuming that I need to see a Cardiologist to get this test through insurance?   I do have one question though. The medicine you gave me is working. I don’t have the symptoms as much. I’m wondering if it is more acid reflux than my heart.   1. Should we see how I do off the medication and see if I need to be on something or change diet more?  2. Should I keep the follow up appointment with you in April?  3. We do a follow up EKG and see what it shows in 6 or so months?   4. Do I still go the Cardiologist route anyway?     Thank you,  Karol

## 2025-03-19 NOTE — TELEPHONE ENCOUNTER
Detailed msg lft for pt. Advised to call with questions. Provided Glory's number for scheduling the referral to Cardiology

## 2025-03-24 PROBLEM — E66.811 CLASS 1 OBESITY WITH BODY MASS INDEX (BMI) OF 31.0 TO 31.9 IN ADULT: Status: ACTIVE | Noted: 2024-02-13

## 2025-04-01 ENCOUNTER — APPOINTMENT (OUTPATIENT)
Dept: RADIOLOGY | Facility: HOSPITAL | Age: 50
End: 2025-04-01
Payer: COMMERCIAL

## 2025-04-01 ENCOUNTER — APPOINTMENT (OUTPATIENT)
Dept: CARDIOLOGY | Facility: HOSPITAL | Age: 50
End: 2025-04-01
Payer: COMMERCIAL

## 2025-04-08 ENCOUNTER — APPOINTMENT (OUTPATIENT)
Dept: PHYSICAL THERAPY | Facility: CLINIC | Age: 50
End: 2025-04-08
Payer: COMMERCIAL

## 2025-04-15 ENCOUNTER — OFFICE VISIT (OUTPATIENT)
Dept: CARDIOLOGY | Facility: CLINIC | Age: 50
End: 2025-04-15
Payer: COMMERCIAL

## 2025-04-15 VITALS
HEART RATE: 93 BPM | BODY MASS INDEX: 31.95 KG/M2 | WEIGHT: 192 LBS | OXYGEN SATURATION: 97 % | SYSTOLIC BLOOD PRESSURE: 146 MMHG | DIASTOLIC BLOOD PRESSURE: 81 MMHG

## 2025-04-15 DIAGNOSIS — R73.03 PREDIABETES: Chronic | ICD-10-CM

## 2025-04-15 DIAGNOSIS — R07.9 CHEST PAIN, UNSPECIFIED TYPE: Primary | ICD-10-CM

## 2025-04-15 DIAGNOSIS — R94.31 ABNORMAL EKG: ICD-10-CM

## 2025-04-15 DIAGNOSIS — C50.919 MALIGNANT NEOPLASM OF FEMALE BREAST, UNSPECIFIED ESTROGEN RECEPTOR STATUS, UNSPECIFIED LATERALITY, UNSPECIFIED SITE OF BREAST: Chronic | ICD-10-CM

## 2025-04-15 DIAGNOSIS — Z82.49 FAMILY HISTORY OF CARDIAC DISORDER: ICD-10-CM

## 2025-04-15 PROCEDURE — 1036F TOBACCO NON-USER: CPT | Performed by: STUDENT IN AN ORGANIZED HEALTH CARE EDUCATION/TRAINING PROGRAM

## 2025-04-15 PROCEDURE — 99214 OFFICE O/P EST MOD 30 MIN: CPT | Performed by: STUDENT IN AN ORGANIZED HEALTH CARE EDUCATION/TRAINING PROGRAM

## 2025-04-15 PROCEDURE — 99204 OFFICE O/P NEW MOD 45 MIN: CPT | Performed by: STUDENT IN AN ORGANIZED HEALTH CARE EDUCATION/TRAINING PROGRAM

## 2025-04-15 ASSESSMENT — ENCOUNTER SYMPTOMS
PND: 0
GASTROINTESTINAL NEGATIVE: 1
RESPIRATORY NEGATIVE: 1
NEUROLOGICAL NEGATIVE: 1
DYSPNEA ON EXERTION: 0
PSYCHIATRIC NEGATIVE: 1
ALLERGIC/IMMUNOLOGIC NEGATIVE: 1
ENDOCRINE NEGATIVE: 1
PALPITATIONS: 0
SYNCOPE: 0
CONSTITUTIONAL NEGATIVE: 1
HEMATOLOGIC/LYMPHATIC NEGATIVE: 1
MUSCULOSKELETAL NEGATIVE: 1
ORTHOPNEA: 0
NEAR-SYNCOPE: 0
EYES NEGATIVE: 1

## 2025-04-15 NOTE — PATIENT INSTRUCTIONS
For your chest pains we will further evaluate with a stress test (exercise treadmill stress test) and a heart ultrasound (echocardiogram).    We will further assess your heart risk with a heart score (coronary calcium score) to look for any evidence of heart plaque.     We will see you back in heart clinic after your testing.     Thank you for your visit today. Please contact our office (via [employer] or phone) with any additional questions.     [facility]    Eugenie, HUI/Clinic Nurse for:    Dr. Bianca Fitch    [address], [address]  [place] [address]    Phone: [phone] Press Option 5 then Option 3 to speak with the Clinic Nurse (Eugenie)    _____    To Reach:    Billing Questions -    [phone]  Scheduling / Rescheduling -  Option 1  Refills / Medication Requests -  Option 3  General Office /  -  Option 4  Results -     Option 6  Medical Records -    Option 7  Repeat Options -    Option 9

## 2025-04-15 NOTE — PROGRESS NOTES
Cardiology New Patient History and Physical    Reason for referral: atypical chest pains,dyslipidemia.     HPI: Karol Hutchinson is a 49 y.o.  female who presents today for atypical chest pains,dyslipidemia. . Past medical history of dyslipidemia, pre-diabetes, breast cancer s/p bilateral mastectomy (s/p radiation therapy + chemo; no evidence of recurrence), GERD.    Patient presented to cardiology clinic on 4/15/2025.  Patient notes intermittent chest pains over the past 6 months; no clear relation to activity. Symptoms are brief nature (less than a minute). No associated nausea / vomiting or diapiresis.  Patient's cardiovascular risk factors include prediabetes, obesity, family history of heart disease (mother, paternal grandfather, brother).  Patient is a non-smoker.     Past Medical History:   - As above    Surgical History:   She has a past surgical history that includes Mastectomy (Bilateral, 08/14/2006); Lymphadenectomy (03/07/2014); and Other surgical history (03/07/2014).    Family History:   Family History   Problem Relation Name Age of Onset    Breast cancer Mother's Sister      Breast cancer Mother's Sister      Breast cancer Maternal Cousin      Atrial fibrillation Mother Carol Hutchinson     Heart disease Mother Carol Hutchinson     Heart disease Paternal Grandfather Brenda Hutchinson     Atrial fibrillation Brother Cruzito Hutchinson     Heart disease Brother Cruzito Hutchinson     Breast cancer Mother's Sister Vero     Breast cancer Mother's Sister Alexus      Allergies:  Ibuprofen and Morphine     Social History:   - non-smoker    Prior Cardiovascular Testing (personally reviewed):     Review of Systems:  Review of Systems   Constitutional: Negative.   HENT: Negative.     Eyes: Negative.    Cardiovascular:  Positive for chest pain. Negative for dyspnea on exertion, near-syncope, orthopnea, palpitations, paroxysmal nocturnal dyspnea and syncope.   Respiratory: Negative.     Endocrine: Negative.     Hematologic/Lymphatic: Negative.    Skin: Negative.    Musculoskeletal: Negative.    Gastrointestinal: Negative.    Genitourinary: Negative.    Neurological: Negative.    Psychiatric/Behavioral: Negative.     Allergic/Immunologic: Negative.        Objective     Outpatient Medications:    Current Outpatient Medications:     omeprazole (PriLOSEC) 40 mg DR capsule, Take 1 capsule (40 mg) by mouth once daily in the morning. Take before meals. Do not crush or chew., Disp: 30 capsule, Rfl: 1    meclizine (Antivert) 25 mg tablet, Take 1 tablet (25 mg) by mouth 3 times a day as needed for dizziness for up to 30 doses., Disp: 30 tablet, Rfl: 0     Last Recorded Vitals  /81   Pulse 93   Wt 87.1 kg (192 lb)   SpO2 97%   BMI 31.95 kg/m²     Physical Exam:  Physical Exam  Constitutional:       General: She is not in acute distress.  HENT:      Head: Normocephalic.      Mouth/Throat:      Mouth: Mucous membranes are moist.   Eyes:      Extraocular Movements: Extraocular movements intact.      Conjunctiva/sclera: Conjunctivae normal.   Neck:      Vascular: No carotid bruit or JVD.   Cardiovascular:      Rate and Rhythm: Normal rate and regular rhythm.      Pulses: Normal pulses.      Heart sounds: No murmur heard.  Pulmonary:      Effort: Pulmonary effort is normal. No respiratory distress.      Breath sounds: Normal breath sounds.   Abdominal:      General: Bowel sounds are normal. There is no distension.      Palpations: Abdomen is soft.   Musculoskeletal:         General: No swelling.   Skin:     General: Skin is warm and dry.   Neurological:      General: No focal deficit present.      Mental Status: She is alert.      Cranial Nerves: No cranial nerve deficit.      Motor: No weakness.   Psychiatric:         Mood and Affect: Mood normal.         Behavior: Behavior normal.       Lab Review:    Lab Results   Component Value Date    GLUCOSE 104 (H) 12/17/2019     Lab Results   Component Value Date    CHOL 247 (H)  "02/18/2025    CHOL 175 02/13/2024    CHOL 181 02/07/2023     Lab Results   Component Value Date    HDL 53 02/18/2025    HDL 46.2 02/13/2024    HDL 48.5 02/07/2023     Lab Results   Component Value Date    LDLCALC 158 (H) 02/18/2025    LDLCALC 101 (H) 02/13/2024     Lab Results   Component Value Date    TRIG 197 (H) 02/18/2025    TRIG 137 02/13/2024    TRIG 137 02/07/2023     Lab Results   Component Value Date    TSH 1.74 02/01/2022       Assessment:   49 y.o.  female who presents today for atypical chest pains,dyslipidemia. . Past medical history of dyslipidemia, pre-diabetes, breast cancer s/p bilateral mastectomy (s/p radiation therapy + chemo; no evidence of recurrence), GERD.    Patient with a 6-month history of intermittent chest pains.  Patient's cardiac risk factors include obesity, prediabetes, dyslipidemia, family history of cardiovascular disease.  Recommend further evaluation with exercise stress ECG and complete transthoracic echocardiogram.    Overall Plan:  1.  Chest pain; cardiovascular risk factors as above  - Check exercise stress ECG  - Check complete transthoracic echocardiogram    2.  Dyslipidemia  - Encouraged dietary and lifestyle modifications  - Encouraged heart healthy diet  - Goal LDL to be determined; await CT cardiac scoring results    3.  Family history of cardiovascular disease  - Given patient's personal risk factors and family history of cardiovascular disease recommend further risk stratification with CT cardiac scoring    4.  Prediabetes  - Encourage dietary and lifestyle modifications    5.  GERD  - Continue omeprazole    6. Discussed \"red flag\" symptoms that should prompt immediate medical attention; patient verbalized understanding    Disposition: Return to cardiology clinic after above testing    Giacomo Valenzuela MD          "

## 2025-04-22 ENCOUNTER — APPOINTMENT (OUTPATIENT)
Dept: PRIMARY CARE | Facility: CLINIC | Age: 50
End: 2025-04-22
Payer: COMMERCIAL

## 2025-04-25 ENCOUNTER — APPOINTMENT (OUTPATIENT)
Dept: DERMATOLOGY | Facility: CLINIC | Age: 50
End: 2025-04-25
Payer: COMMERCIAL

## 2025-04-27 ASSESSMENT — DERMATOLOGY QUALITY OF LIFE (QOL) ASSESSMENT
WHAT SINGLE SKIN CONDITION LISTED BELOW IS THE PATIENT ANSWERING THE QUALITY-OF-LIFE ASSESSMENT QUESTIONS ABOUT: NONE OF THE ABOVE
RATE HOW BOTHERED YOU ARE BY EFFECTS OF YOUR SKIN PROBLEMS ON YOUR ACTIVITIES (EG, GOING OUT, ACCOMPLISHING WHAT YOU WANT, WORK ACTIVITIES OR YOUR RELATIONSHIPS WITH OTHERS): 0 - NEVER BOTHERED
RATE HOW EMOTIONALLY BOTHERED YOU ARE BY YOUR SKIN PROBLEM (FOR EXAMPLE, WORRY, EMBARRASSMENT, FRUSTRATION): 0 - NEVER BOTHERED
WHAT SINGLE SKIN CONDITION LISTED BELOW IS THE PATIENT ANSWERING THE QUALITY-OF-LIFE ASSESSMENT QUESTIONS ABOUT: NONE OF THE ABOVE
RATE HOW BOTHERED YOU ARE BY SYMPTOMS OF YOUR SKIN PROBLEM (EG, ITCHING, STINGING BURNING, HURTING OR SKIN IRRITATION): 0 - NEVER BOTHERED
RATE HOW EMOTIONALLY BOTHERED YOU ARE BY YOUR SKIN PROBLEM (FOR EXAMPLE, WORRY, EMBARRASSMENT, FRUSTRATION): 0 - NEVER BOTHERED
RATE HOW BOTHERED YOU ARE BY SYMPTOMS OF YOUR SKIN PROBLEM (EG, ITCHING, STINGING BURNING, HURTING OR SKIN IRRITATION): 0 - NEVER BOTHERED
RATE HOW BOTHERED YOU ARE BY EFFECTS OF YOUR SKIN PROBLEMS ON YOUR ACTIVITIES (EG, GOING OUT, ACCOMPLISHING WHAT YOU WANT, WORK ACTIVITIES OR YOUR RELATIONSHIPS WITH OTHERS): 0 - NEVER BOTHERED

## 2025-04-27 ASSESSMENT — PATIENT GLOBAL ASSESSMENT (PGA): WHAT IS THE PGA: PATIENT GLOBAL ASSESSMENT:  1 - CLEAR

## 2025-04-28 ENCOUNTER — APPOINTMENT (OUTPATIENT)
Dept: DERMATOLOGY | Facility: CLINIC | Age: 50
End: 2025-04-28
Payer: COMMERCIAL

## 2025-04-28 DIAGNOSIS — D22.9 MELANOCYTIC NEVUS, UNSPECIFIED LOCATION: ICD-10-CM

## 2025-04-28 DIAGNOSIS — D18.01 HEMANGIOMA OF SKIN: ICD-10-CM

## 2025-04-28 DIAGNOSIS — L81.4 LENTIGO: ICD-10-CM

## 2025-04-28 DIAGNOSIS — Z12.83 ENCOUNTER FOR SCREENING FOR MALIGNANT NEOPLASM OF SKIN: Primary | ICD-10-CM

## 2025-04-28 PROCEDURE — 99213 OFFICE O/P EST LOW 20 MIN: CPT | Performed by: NURSE PRACTITIONER

## 2025-04-28 PROCEDURE — 1036F TOBACCO NON-USER: CPT | Performed by: NURSE PRACTITIONER

## 2025-04-28 NOTE — Clinical Note
- Protective measures, such as avoiding skin exposure to sunlight during peak sun hours (10 AM to 3 PM), wearing protective clothing, and applying high-SPF sunscreen, are essential for reducing exposure to harmful ultraviolet (UV) light.  - Monthly self-examination of the skin is helpful to detect new lesions or changes in existing lesions.  - Discussed signs and symptoms of sun-related skin cancers.   - Make sure your moles are not signs of skin cancer (melanoma). Remember the ABCDEs of melanoma lesions:  A - Asymmetry: One half of the lesion does not mirror the other half.  B - Border: The borders are irregular or vague (indistinct).  C - Color: More than one color may be noted within the mole.  D - Diameter: Size greater than 6 mm (roughly the size of a pencil eraser) may be concerning.  E - Evolving: Notable changes in the lesion over time are suspicious signs for skin cancer.

## 2025-04-28 NOTE — PROGRESS NOTES
Subjective     Karol Hutchinson is a 49 y.o. female who presents for the following: Skin Check (Presents for FBSE. Denies personal or family hx of skin cancer. Denies new lesions of concern. ).     Review of Systems:  No other skin or systemic complaints other than what is documented elsewhere in the note.    The following portions of the chart were reviewed this encounter and updated as appropriate:  Tobacco  Allergies  Meds  Problems  Med Hx  Surg Hx  Fam Hx         Skin Cancer History  Biopsy Log Book  No skin cancers from Specimen Tracking.    Additional History      Specialty Problems          Dermatology Problems    Rosacea        Objective   Well appearing patient in no apparent distress; mood and affect are within normal limits.    A full examination was performed including scalp, head, eyes, ears, nose, lips, neck, chest, axillae, abdomen, back, buttocks, bilateral upper extremities, bilateral lower extremities, hands, feet, fingers, toes, fingernails, and toenails. All findings within normal limits unless otherwise noted below.    Assessment/Plan   Skin Exam  1. ENCOUNTER FOR SCREENING FOR MALIGNANT NEOPLASM OF SKIN  Generalized  Scattered benign lesions  - Protective measures, such as avoiding skin exposure to sunlight during peak sun hours (10 AM to 3 PM), wearing protective clothing, and applying high-SPF sunscreen, are essential for reducing exposure to harmful ultraviolet (UV) light.  - Monthly self-examination of the skin is helpful to detect new lesions or changes in existing lesions.  - Discussed signs and symptoms of sun-related skin cancers.   - Make sure your moles are not signs of skin cancer (melanoma). Remember the ABCDEs of melanoma lesions:  A - Asymmetry: One half of the lesion does not mirror the other half.  B - Border: The borders are irregular or vague (indistinct).  C - Color: More than one color may be noted within the mole.  D - Diameter: Size greater than 6 mm (roughly the  size of a pencil eraser) may be concerning.  E - Evolving: Notable changes in the lesion over time are suspicious signs for skin cancer.  2. MELANOCYTIC NEVUS, UNSPECIFIED LOCATION  Generalized  Uniform pigmented macule(s)/papule(s) with reassuring findings on dermoscopy  -Discussed nature of condition  -Reassurance, benign-appearing features on examination today  -Recommend continued observation  3. HEMANGIOMA OF SKIN  Generalized  Violaceous/red papule with maroon lagoons   - A cherry hemangioma is a small macule (small, flat, smooth area) or papule (small, solid bump) formed from an overgrowth of tiny blood vessels in the skin. Cherry hemangiomas are characteristically red or purplish in color. They often first appear in middle adulthood and usually increase in number with age. Cherry hemangiomas are noncancerous (benign) and are common in adults.  - Lesions are benign, reassured patient.   4. LENTIGO  Generalized  Scattered tan macules in sun-exposed areas.  A solar lentigo (plural, solar lentigines), sometimes called an age spot or liver spot, is a brown macule (small, flat, smooth area of skin) caused by chronic sun or artificial ultraviolet (UV) light exposure. There may be just one lentigo or there may be multiple. This type of lentigo is different from lentigo simplex (discussed separately) because it is caused by exposure to UV light. Solar lentigines are benign, but they do indicate excessive sun exposure, a risk factor for the development of skin cancer.  Lesions are benign, no treatment needed.     Follow up in 12 months for a total body skin exam. Please call me if there are any changes or development of concerning symptoms (lesion/skin condition is changing, bleeding, enlarging, or worsening).

## 2025-05-13 ENCOUNTER — HOSPITAL ENCOUNTER (OUTPATIENT)
Dept: CARDIOLOGY | Facility: CLINIC | Age: 50
Discharge: HOME | End: 2025-05-13
Payer: COMMERCIAL

## 2025-05-13 DIAGNOSIS — R07.9 CHEST PAIN, UNSPECIFIED TYPE: ICD-10-CM

## 2025-05-13 PROCEDURE — 93306 TTE W/DOPPLER COMPLETE: CPT | Performed by: STUDENT IN AN ORGANIZED HEALTH CARE EDUCATION/TRAINING PROGRAM

## 2025-05-13 PROCEDURE — 93306 TTE W/DOPPLER COMPLETE: CPT

## 2025-05-13 PROCEDURE — 93017 CV STRESS TEST TRACING ONLY: CPT

## 2025-05-13 PROCEDURE — 93018 CV STRESS TEST I&R ONLY: CPT | Performed by: STUDENT IN AN ORGANIZED HEALTH CARE EDUCATION/TRAINING PROGRAM

## 2025-05-13 PROCEDURE — 93016 CV STRESS TEST SUPVJ ONLY: CPT | Performed by: STUDENT IN AN ORGANIZED HEALTH CARE EDUCATION/TRAINING PROGRAM

## 2025-05-15 LAB
AORTIC VALVE MEAN GRADIENT: 5 MMHG
AORTIC VALVE PEAK VELOCITY: 1.47 M/S
AV PEAK GRADIENT: 9 MMHG
AVA (PEAK VEL): 2.46 CM2
AVA (VTI): 2.43 CM2
EJECTION FRACTION APICAL 4 CHAMBER: 57.6
EJECTION FRACTION: 58 %
LEFT ATRIUM VOLUME AREA LENGTH INDEX BSA: 28.8 ML/M2
LEFT VENTRICLE INTERNAL DIMENSION DIASTOLE: 4.8 CM (ref 3.5–6)
LEFT VENTRICULAR OUTFLOW TRACT DIAMETER: 2 CM
MITRAL VALVE E/A RATIO: 1.11
RIGHT VENTRICLE FREE WALL PEAK S': 12.1 CM/S
TRICUSPID ANNULAR PLANE SYSTOLIC EXCURSION: 2.1 CM

## 2025-05-27 ENCOUNTER — OFFICE VISIT (OUTPATIENT)
Dept: CARDIOLOGY | Facility: CLINIC | Age: 50
End: 2025-05-27
Payer: COMMERCIAL

## 2025-05-27 VITALS
HEART RATE: 70 BPM | HEIGHT: 65 IN | DIASTOLIC BLOOD PRESSURE: 80 MMHG | BODY MASS INDEX: 32.49 KG/M2 | OXYGEN SATURATION: 98 % | SYSTOLIC BLOOD PRESSURE: 134 MMHG | WEIGHT: 195 LBS

## 2025-05-27 DIAGNOSIS — R73.03 PREDIABETES: ICD-10-CM

## 2025-05-27 DIAGNOSIS — Z82.49 FAMILY HISTORY OF CARDIAC DISORDER: ICD-10-CM

## 2025-05-27 DIAGNOSIS — E78.5 DYSLIPIDEMIA: Primary | ICD-10-CM

## 2025-05-27 PROCEDURE — 99213 OFFICE O/P EST LOW 20 MIN: CPT | Performed by: STUDENT IN AN ORGANIZED HEALTH CARE EDUCATION/TRAINING PROGRAM

## 2025-05-27 PROCEDURE — 3008F BODY MASS INDEX DOCD: CPT | Performed by: STUDENT IN AN ORGANIZED HEALTH CARE EDUCATION/TRAINING PROGRAM

## 2025-05-27 PROCEDURE — 1036F TOBACCO NON-USER: CPT | Performed by: STUDENT IN AN ORGANIZED HEALTH CARE EDUCATION/TRAINING PROGRAM

## 2025-05-27 ASSESSMENT — ENCOUNTER SYMPTOMS
MUSCULOSKELETAL NEGATIVE: 1
NEUROLOGICAL NEGATIVE: 1
RESPIRATORY NEGATIVE: 1
ORTHOPNEA: 0
PALPITATIONS: 0
NEAR-SYNCOPE: 0
GASTROINTESTINAL NEGATIVE: 1
ALLERGIC/IMMUNOLOGIC NEGATIVE: 1
PND: 0
DYSPNEA ON EXERTION: 0
CONSTITUTIONAL NEGATIVE: 1
LOSS OF SENSATION IN FEET: 0
PSYCHIATRIC NEGATIVE: 1
DEPRESSION: 0
ENDOCRINE NEGATIVE: 1
SYNCOPE: 0
OCCASIONAL FEELINGS OF UNSTEADINESS: 0
HEMATOLOGIC/LYMPHATIC NEGATIVE: 1
EYES NEGATIVE: 1

## 2025-05-27 NOTE — PROGRESS NOTES
Cardiology Established Outpatient Visit    Reason for visit: atypical chest pains,dyslipidemia.     HPI: Karol Hutchinson is a 49 y.o.  female who presents today for atypical chest pains,dyslipidemia.  Past medical history of dyslipidemia, pre-diabetes, breast cancer s/p bilateral mastectomy (s/p radiation therapy + chemo; no evidence of recurrence), GERD.    Patient presented to cardiology clinic on 4/15/2025.  Patient notes intermittent chest pains over the past 6 months; no clear relation to activity. Symptoms are brief nature (less than a minute). No associated nausea / vomiting or diapiresis.  Patient's cardiovascular risk factors include prediabetes, obesity, family history of heart disease (mother, paternal grandfather, brother).  Patient is a non-smoker.     Patient with a 6-month history of intermittent chest pains.  Patient's cardiac risk factors include obesity, prediabetes, dyslipidemia, family history of cardiovascular disease.  Recommend further evaluation with exercise stress ECG and complete transthoracic echocardiogram.    Karol presented to cardiology clinic on 5/27/2025.  Patient currently asymptomatic and chest pain-free.  No dyspnea.  Patient appears euvolemic on exam.  Exercise stress test was low risk for ischemia.  Transthoracic echocardiogram showed normal LVEF 55 to 60%; normal wall motion.  Will defer additional testing at this time as patient is asymptomatic.  Blood pressure currently well-controlled.  Dyslipidemia currently suboptimally controlled (goal LDL less than 100 mg/dL).  Discussed dietary and lifestyle modifications; discussed option of statin therapy, patient would like to defer for now.    Past Medical History:   - As above    Surgical History:   She has a past surgical history that includes Mastectomy (Bilateral, 08/14/2006); Lymphadenectomy (03/07/2014); and Other surgical history (03/07/2014).    Family History:   Family History   Problem Relation Name Age  "of Onset    Atrial fibrillation Mother Carol Hutchinson     Heart disease Mother Carol Hutchinson     Atrial fibrillation Brother Cruzito Hutchinson     Heart disease Brother Cruzito Hutchinson     Hypertrophic cardiomyopathy Brother Cruzito Hutchinson          age 32; ? viral cardiomyopathy    Breast cancer Mother's Sister      Breast cancer Mother's Sister      Breast cancer Mother's Sister Vero     Breast cancer Mother's Sister Alexus     Heart disease Paternal Grandfather Brenda Hutchinson     Breast cancer Maternal Cousin       Allergies:  Ibuprofen and Morphine     Social History:   - non-smoker    Prior Cardiovascular Testing (personally reviewed):     Exercise stress ECG (2025)   1. Adequate level of stress achieved.   2. Above average functional capacity; normal blood pressure reponse.   3. Patient had chest pain at max exercise that resolved during phase 2 of recovery.   4. No electrocardiographic evidence for ischemia at a maximal workload.    TTE (2025)   1. Left ventricular ejection fraction is normal, by visual estimate at 55-60%.   2. There is normal right ventricular global systolic function.    Review of Systems:  Review of Systems   Constitutional: Negative.   HENT: Negative.     Eyes: Negative.    Cardiovascular:  Positive for chest pain. Negative for dyspnea on exertion, near-syncope, orthopnea, palpitations, paroxysmal nocturnal dyspnea and syncope.   Respiratory: Negative.     Endocrine: Negative.    Hematologic/Lymphatic: Negative.    Skin: Negative.    Musculoskeletal: Negative.    Gastrointestinal: Negative.    Genitourinary: Negative.    Neurological: Negative.    Psychiatric/Behavioral: Negative.     Allergic/Immunologic: Negative.        Objective     Outpatient Medications:  No current outpatient medications on file.     Last Recorded Vitals  /80 (BP Location: Left arm, Patient Position: Sitting)   Pulse 70   Ht 1.651 m (5' 5\")   Wt 88.5 kg (195 lb)   SpO2 98%   BMI 32.45 " kg/m²     Physical Exam:  Physical Exam  Constitutional:       General: She is not in acute distress.  HENT:      Head: Normocephalic.      Mouth/Throat:      Mouth: Mucous membranes are moist.   Eyes:      Extraocular Movements: Extraocular movements intact.      Conjunctiva/sclera: Conjunctivae normal.   Neck:      Vascular: No carotid bruit or JVD.   Cardiovascular:      Rate and Rhythm: Normal rate and regular rhythm.      Pulses: Normal pulses.      Heart sounds: No murmur heard.  Pulmonary:      Effort: Pulmonary effort is normal. No respiratory distress.      Breath sounds: Normal breath sounds.   Abdominal:      General: Bowel sounds are normal. There is no distension.      Palpations: Abdomen is soft.   Musculoskeletal:         General: No swelling.   Skin:     General: Skin is warm and dry.   Neurological:      General: No focal deficit present.      Mental Status: She is alert.      Cranial Nerves: No cranial nerve deficit.      Motor: No weakness.   Psychiatric:         Mood and Affect: Mood normal.         Behavior: Behavior normal.       Lab Review:    Lab Results   Component Value Date    GLUCOSE 104 (H) 12/17/2019     Lab Results   Component Value Date    CHOL 247 (H) 02/18/2025    CHOL 175 02/13/2024    CHOL 181 02/07/2023     Lab Results   Component Value Date    HDL 53 02/18/2025    HDL 46.2 02/13/2024    HDL 48.5 02/07/2023     Lab Results   Component Value Date    LDLCALC 158 (H) 02/18/2025    LDLCALC 101 (H) 02/13/2024     Lab Results   Component Value Date    TRIG 197 (H) 02/18/2025    TRIG 137 02/13/2024    TRIG 137 02/07/2023     Lab Results   Component Value Date    TSH 1.74 02/01/2022       Assessment:   49 y.o.  female who presents today for atypical chest pains,dyslipidemia. . Past medical history of dyslipidemia, pre-diabetes, breast cancer s/p bilateral mastectomy (s/p radiation therapy + chemo; no evidence of recurrence), GERD.    Karol presented to cardiology clinic on  "5/27/2025.  Patient currently asymptomatic and chest pain-free.  No dyspnea.  Patient appears euvolemic on exam.  Exercise stress test was low risk for ischemia.  Transthoracic echocardiogram showed normal LVEF 55 to 60%; normal wall motion.  Will defer additional testing at this time as patient is asymptomatic.  Blood pressure currently well-controlled.  Dyslipidemia currently suboptimally controlled (goal LDL less than 100 mg/dL).  Discussed dietary and lifestyle modifications; discussed option of statin therapy, patient would like to defer for now.    Overall Plan:  1.  Atypical chest pain (resolved)  - Exercise stress ECG was an adequate stress test and low risk for ischemia; transthoracic echocardiogram showed normal LVEF, normal wall motion  - No further testing recommended this time  - If patient has recurrent symptoms would then consider coronary CTA    2.  Dyslipidemia  - Suboptimal control  - Continue dietary and lifestyle modifications  - Discussed the option of statin therapy; patient want to think about it  - Repeat fasting lipid panel in 6 months    3.  Family history of cardiovascular disease  - Given patient's personal risk factors and family history of cardiovascular disease recommend further risk stratification with CT cardiac scoring; await CT cardiac scoring    4.  Prediabetes  - Encourage dietary and lifestyle modifications  - Encouraged regular physical activity    5.  GERD  - Continue omeprazole    6. Discussed \"red flag\" symptoms that should prompt immediate medical attention; patient verbalized understanding    Disposition: Return to cardiology clinic in 6-12 months     Giacomo Valenzuela MD          "

## 2025-05-27 NOTE — PATIENT INSTRUCTIONS
Thank you for your visit today. Please contact our office (via MyChart or phone) with any additional questions.     [facility] [place]    Eugenie RN/Clinic Nurse for:  Giacomo Valenzuela MD    [address], [address]  [place] [address]    Phone: [phone] Press Option 5 then Option 3 to speak with the Clinic Nurse (Eugenie)    _____    To Reach:    Billing Questions -    [phone]  Scheduling / Rescheduling -  Option 1  Refills / Medication Requests -  Option 3  General Office /  -  Option 4  Results -     Option 6  Medical Records -    Option 7  Repeat Options -    Option 9

## 2025-06-03 PROBLEM — E78.5 DYSLIPIDEMIA: Status: ACTIVE | Noted: 2025-06-03

## 2025-06-03 PROBLEM — R07.9 CHEST PAIN: Chronic | Status: RESOLVED | Noted: 2025-02-18 | Resolved: 2025-06-03

## 2025-06-17 NOTE — PROGRESS NOTES
Subjective      HPI:          Karol Hutchinson is a 48 y.o. female 48 y.o. is here today for PE/health maintenance      Chief Complaint   Patient presents with    Annual Exam     Pt states she has no health concerns    Immunizations     Declines influenza vaccine, but is unsure of other immunizations status       Has pt had a colonoscopy?  No            Other medical specialists are involved in patient's care.    Any recent notes that were available were reviewed.    All conditions are monitored, evaluated and assessed regularly.    Patient is compliant with current treatment regimen/medications.    Specialists involved include:      Dr Wong GYN- PN 8/11/2023      History breast cancer- Dr Haleigh Figueredo- no longer sees - due mammo 10/2024         Requests referral Derm- rosacea and moles           Discussed weight looks great- down about 30 pounds- better diet and exercise       USPTFS recommend  laboratory  screening for HIV in patients ages 15-65        USPTFS recommend  laboratory screening for Hepatitis C for all adults ages 18- 79 years.      Health Maintenance Topics        Topic Date     Yearly Adult Physical today    HIV Screening Never done    Colorectal Cancer Screening Referred     Hepatitis C Screening Never done     Health Maintenance Topics with due status: Not Due       Topic Last Completion Date    DTaP/Tdap/Td Vaccines 01/05/2021    Cervical Cancer Screening- GYN Dr Wong  02/01/2022    Zoster Vaccines Not Due     Health Maintenance Topics with due status: Completed       Topic Last Completion Date    MMR Vaccines 02/14/2011    Hepatitis B Vaccines 04/02/2019     Health Maintenance Topics with due status: Aged Out       Topic Date Due    HIB Vaccines Aged Out    IPV Vaccines Aged Out    Hepatitis A Vaccines Aged Out    Meningococcal Vaccine Aged Out    Rotavirus Vaccines Aged Out    HPV Vaccines Aged Out    Pneumococcal Vaccine: Pediatrics (0 to 5 Years) and At-Risk Patients (6 to 64 Years) Aged  Out                 Immunization History   Administered Date(s) Administered    Hepatitis B vaccine, adult (HEPLISAV) 11/27/2018, 04/02/2019    Hepatitis B vaccine, adult (RECOMBIVAX, ENGERIX) 09/25/2018    MMR vaccine, subcutaneous (MMR II) 02/14/2011    Tdap vaccine, age 7 year and older (BOOSTRIX, ADACEL) 05/04/2010, 01/05/2021         Social History     Tobacco Use   Smoking Status Never   Smokeless Tobacco Never                reports that she does not currently use alcohol.       No visits with results within 12 Month(s) from this visit.   Latest known visit with results is:   Legacy Encounter on 02/07/2023   Component Date Value Ref Range Status    Hemoglobin A1C 02/07/2023 5.5  % Final    Comment:      Diagnosis of Diabetes-Adults   Non-Diabetic: < or = 5.6%   Increased risk for developing diabetes: 5.7-6.4%   Diagnostic of diabetes: > or = 6.5%  .       Monitoring of Diabetes                Age (y)     Therapeutic Goal (%)   Adults:          >18           <7.0   Pediatrics:    13-18           <7.5                   7-12           <8.0                   0- 6            7.5-8.5   American Diabetes Association. Diabetes Care 33(S1), Jan 2010.      Estimated Average Glucose 02/07/2023 111  MG/DL Final    Cholesterol 02/07/2023 181  0 - 199 mg/dL Final    Comment: .      AGE      DESIRABLE   BORDERLINE HIGH   HIGH     0-19 Y     0 - 169       170 - 199     >/= 200    20-24 Y     0 - 189       190 - 224     >/= 225         >24 Y     0 - 199       200 - 239     >/= 240   **All ranges are based on fasting samples. Specific   therapeutic targets will vary based on patient-specific   cardiac risk.  .   Pediatric guidelines reference:Pediatrics 2011, 128(S5).   Adult guidelines reference: NCEP ATPIII Guidelines,     LEANDRO 2001, 258:2486-97  .   Venipuncture immediately after or during the    administration of Metamizole may lead to falsely   low results. Testing should be performed immediately   prior to Metamizole  "dosing.      HDL 02/07/2023 48.5  mg/dL Final    Comment: .      AGE      VERY LOW   LOW     NORMAL    HIGH       0-19 Y       < 35   < 40     40-45     ----    20-24 Y       ----   < 40       >45     ----      >24 Y       ----   < 40     40-60      >60  .      Cholesterol/HDL Ratio 02/07/2023 3.7   Final    Comment: REF VALUES  DESIRABLE  < 3.4  HIGH RISK  > 5.0      LDL 02/07/2023 105 (H)  0 - 99 mg/dL Final    Comment: .                           NEAR      BORD      AGE      DESIRABLE  OPTIMAL    HIGH     HIGH     VERY HIGH     0-19 Y     0 - 109     ---    110-129   >/= 130     ----    20-24 Y     0 - 119     ---    120-159   >/= 160     ----      >24 Y     0 -  99   100-129  130-159   160-189     >/=190  .      VLDL 02/07/2023 27  0 - 40 mg/dL Final    Triglycerides 02/07/2023 137  0 - 149 mg/dL Final    Comment: .      AGE      DESIRABLE   BORDERLINE HIGH   HIGH     VERY HIGH   0 D-90 D    19 - 174         ----         ----        ----  91 D- 9 Y     0 -  74        75 -  99     >/= 100      ----    10-19 Y     0 -  89        90 - 129     >/= 130      ----    20-24 Y     0 - 114       115 - 149     >/= 150      ----         >24 Y     0 - 149       150 - 199    200- 499    >/= 500  .   Venipuncture immediately after or during the    administration of Metamizole may lead to falsely   low results. Testing should be performed immediately   prior to Metamizole dosing.               Current Outpatient Medications:     cholecalciferol (Vitamin D-3) 125 MCG (5000 UT) capsule, Take by mouth., Disp: , Rfl:     L. acidophilus/Bifid. animalis 32 billion cell capsule, Take by mouth., Disp: , Rfl:       ROS:            Objective        PE:    Vitals:    02/13/24 0749   BP: 121/75   BP Location: Left arm   Patient Position: Sitting   BP Cuff Size: Adult   Pulse: 65   Temp: 36.9 °C (98.5 °F)   TempSrc: Temporal   SpO2: 98%   Weight: 83.4 kg (183 lb 12.8 oz)   Height: 1.664 m (5' 5.5\")               Pt is A and O x3, NAD  Head- " normocephalic and atraumatic,   EYES- conjunctiva- normal   lids- normal  EARS/NOSE- TM's normal, nasopharynx- normal and atraumatic  OROPHARYNX- normal  NECK- supple, FROM  THYROID- NT, normal size, no nodule noted  LYMPH- no cervical lymph nodes palpated   CV- RRR without murmur  PULM- CTA bilaterally, normal respiratory effort  RESPIRATORY EFFORT- normal , no retractions or nasal flaring   ABD- normoactive BS's , soft , NT, no hepatosplenomegaly palpated  EXT- no edema,NT  SKIN- no abnormal skin lesions noted  NEURO- no focal deficits  PSYCH- pleasant, normal judgement and insight    BP Readings from Last 3 Encounters:   02/13/24 121/75   08/11/23 125/81   02/07/23 132/89         Wt Readings from Last 3 Encounters:   02/13/24 83.4 kg (183 lb 12.8 oz)   08/11/23 88.2 kg (194 lb 8 oz)   02/07/23 95.8 kg (211 lb 4.8 oz)         BMI Readings from Last 3 Encounters:   02/13/24 30.12 kg/m²   08/11/23 30.46 kg/m²   02/07/23 33.09 kg/m²       The number and complexity of problems addressed is considered moderate.  The amount and/or complexity of data reviewed and analyzed is considered moderate. The risk of complications and/or morbidity/mortality of patient is considered moderate. Overall, this patient encounter is considered a moderate risk visit.      Patient's BMI is elevated.  Plan- diet and exercise- BMI is elevated. Need to increase activity on a daily basis especially walking.  Monitor  total calories per day- decrease carbohydrates and fats. Goal - lose 1-2 pounds per week.    Recommend 150 minutes of moderate-intensity exercise as tolerated per week and 2-3 days of resistance, flexibility, and neuromotor exercises per week.    Normal BMI- 18.5-25    Overweight=  BMI 26-29    Obese= BMI 30-39    Morbidly Obese = BMI >40          Assessment/Plan      Problem List Items Addressed This Visit          Active Problems    Breast CA (CMS/HCC) (Chronic)    Class 1 obesity due to excess calories with body mass index  (BMI) of 30.0 to 30.9 in adult (Chronic)    Encounter for hepatitis C screening test for low risk patient (Chronic)    Relevant Orders    Hepatitis C Antibody    History of mastectomy, total    Prediabetes    Relevant Orders    Hemoglobin A1C    Rosacea (Chronic)    Relevant Orders    Referral to Dermatology    Screen for colon cancer (Chronic)    Relevant Orders    Colonoscopy Screening; Average Risk Patient    Screening for cholesterol level (Chronic)    Relevant Orders    Lipid Panel    Screening for HIV (human immunodeficiency virus) (Chronic)    Relevant Orders    HIV 1/2 Antigen/Antibody Screen with Reflex to Confirmation    Well adult exam - Primary (Chronic)    Relevant Orders    Follow Up In [facility] - PCP - Established       Orders Placed This Encounter   Procedures    Lipid Panel    Hepatitis C Antibody    HIV 1/2 Antigen/Antibody Screen with Reflex to Confirmation    Hemoglobin A1C    Referral to Dermatology           Ordered lab        Follow up 12 months         Recommendations for women annual wellness exam:   Make sure screenings for cervical and breast cancer are up to date if applicable- pap smears age 21-65  mammogram starting at age 35- 40 or sooner if positive family history of breast cancer   colonoscopy at age 45, earlier if positive family history for breast or colon cancer   Screen for osteoporosis with DXA bone scan starting at age 65 or sooner if risk factors present (long term steroid use, smoking, heavy alcohol use, history of fracture, rheumatoid arthritis, low body weight, family history of hip fracture)  Screening for lung cancer with low-dose CT in all adults age 50-80 who have a 20 pack-year smoking history and currently smoke or have quit within the past 15 years; and are symptom free/no prior pulmonary diagnosis  Gardisil vaccine age 9-26 years of age   Follow a healthy diet (Examples, Dash diet, Mediterranean diet)  Exercise 150 minutes per week   Maintain healthy  weight (BMI < 25)  Do not smoke   Alcohol in moderation (up to 1 drink/day)  Get enough sleep (7-8 hours/night)  Take a prenatal vitamin with folic acid if possibility of pregnancy   Make sure immunizations are up to date   Recommend minimum 1,000 mg calcium and 600-800 IU vitamin D daily (combination of diet + supplement)- unless there is a contraindication   Visit dentist twice yearly      If you are less than 60 years old, have diabetes mellitus, or chronic kidney disease, your goal blood pressure is < 140/90.  If you are older than 60 years old and do not have diabetes or kidney disease, your goal blood pressure is < 150/90.           Subjective      HPI:          Karol Hutchinson is a 48 y.o. female 48 y.o. is here today for PE/health maintenance      Chief Complaint   Patient presents with    Annual Exam     Pt states she has no health concerns    Immunizations     Declines influenza vaccine, but is unsure of other immunizations status           Pt also due for f/up chronic medical problems-          Health Maintenance Topics with due status: Overdue       Topic Date Due    Yearly Adult Physical Never done    HIV Screening Never done    Colorectal Cancer Screening Never done    COVID-19 Vaccine Never done    Hepatitis C Screening Never done    Influenza Vaccine Never done    Diabetes: Hemoglobin A1C 02/07/2024     Health Maintenance Topics with due status: Not Due       Topic Last Completion Date    DTaP/Tdap/Td Vaccines 01/05/2021    Cervical Cancer Screening 02/01/2022    Lipid Panel 02/07/2023    Zoster Vaccines Not Due     Health Maintenance Topics with due status: Completed       Topic Last Completion Date    MMR Vaccines 02/14/2011    Hepatitis B Vaccines 04/02/2019     Health Maintenance Topics with due status: Aged Out       Topic Date Due    HIB Vaccines Aged Out    IPV Vaccines Aged Out    Hepatitis A Vaccines Aged Out    Meningococcal Vaccine Aged Out    Rotavirus Vaccines Aged Out    HPV Vaccines  Aged Out    Pneumococcal Vaccine: Pediatrics (0 to 5 Years) and At-Risk Patients (6 to 64 Years) Aged Out                 Immunization History   Administered Date(s) Administered    Hepatitis B vaccine, adult (HEPLISAV) 11/27/2018, 04/02/2019    Hepatitis B vaccine, adult (RECOMBIVAX, ENGERIX) 09/25/2018    MMR vaccine, subcutaneous (MMR II) 02/14/2011    Tdap vaccine, age 7 year and older (BOOSTRIX, ADACEL) 05/04/2010, 01/05/2021         Social History     Tobacco Use   Smoking Status Never   Smokeless Tobacco Never         If candidate, consider LDLCT-         reports that she does not currently use alcohol.       No visits with results within 12 Month(s) from this visit.   Latest known visit with results is:   Legacy Encounter on 02/07/2023   Component Date Value Ref Range Status    Hemoglobin A1C 02/07/2023 5.5  % Final    Comment:      Diagnosis of Diabetes-Adults   Non-Diabetic: < or = 5.6%   Increased risk for developing diabetes: 5.7-6.4%   Diagnostic of diabetes: > or = 6.5%  .       Monitoring of Diabetes                Age (y)     Therapeutic Goal (%)   Adults:          >18           <7.0   Pediatrics:    13-18           <7.5                   7-12           <8.0                   0- 6            7.5-8.5   American Diabetes Association. Diabetes Care 33(S1), Jan 2010.      Estimated Average Glucose 02/07/2023 111  MG/DL Final    Cholesterol 02/07/2023 181  0 - 199 mg/dL Final    Comment: .      AGE      DESIRABLE   BORDERLINE HIGH   HIGH     0-19 Y     0 - 169       170 - 199     >/= 200    20-24 Y     0 - 189       190 - 224     >/= 225         >24 Y     0 - 199       200 - 239     >/= 240   **All ranges are based on fasting samples. Specific   therapeutic targets will vary based on patient-specific   cardiac risk.  .   Pediatric guidelines reference:Pediatrics 2011, 128(S5).   Adult guidelines reference: NCEP ATPIII Guidelines,     LEANDRO 2001, 258:2486-97  .   Venipuncture immediately after or during the     administration of Metamizole may lead to falsely   low results. Testing should be performed immediately   prior to Metamizole dosing.      HDL 02/07/2023 48.5  mg/dL Final    Comment: .      AGE      VERY LOW   LOW     NORMAL    HIGH       0-19 Y       < 35   < 40     40-45     ----    20-24 Y       ----   < 40       >45     ----      >24 Y       ----   < 40     40-60      >60  .      Cholesterol/HDL Ratio 02/07/2023 3.7   Final    Comment: REF VALUES  DESIRABLE  < 3.4  HIGH RISK  > 5.0      LDL 02/07/2023 105 (H)  0 - 99 mg/dL Final    Comment: .                           NEAR      BORD      AGE      DESIRABLE  OPTIMAL    HIGH     HIGH     VERY HIGH     0-19 Y     0 - 109     ---    110-129   >/= 130     ----    20-24 Y     0 - 119     ---    120-159   >/= 160     ----      >24 Y     0 -  99   100-129  130-159   160-189     >/=190  .      VLDL 02/07/2023 27  0 - 40 mg/dL Final    Triglycerides 02/07/2023 137  0 - 149 mg/dL Final    Comment: .      AGE      DESIRABLE   BORDERLINE HIGH   HIGH     VERY HIGH   0 D-90 D    19 - 174         ----         ----        ----  91 D- 9 Y     0 -  74        75 -  99     >/= 100      ----    10-19 Y     0 -  89        90 - 129     >/= 130      ----    20-24 Y     0 - 114       115 - 149     >/= 150      ----         >24 Y     0 - 149       150 - 199    200- 499    >/= 500  .   Venipuncture immediately after or during the    administration of Metamizole may lead to falsely   low results. Testing should be performed immediately   prior to Metamizole dosing.               Current Outpatient Medications:     cholecalciferol (Vitamin D-3) 125 MCG (5000 UT) capsule, Take by mouth., Disp: , Rfl:     L. acidophilus/Bifid. animalis 32 billion cell capsule, Take by mouth., Disp: , Rfl:       ROS:            Objective        PE:    Vitals:    02/13/24 0749   BP: 121/75   BP Location: Left arm   Patient Position: Sitting   BP Cuff Size: Adult   Pulse: 65   Temp: 36.9 °C (98.5 °F)  "  TempSrc: Temporal   SpO2: 98%   Weight: 83.4 kg (183 lb 12.8 oz)   Height: 1.664 m (5' 5.5\")               Pt is A and O x3, NAD  Head- normocephalic and atraumatic,   EYES- conjunctiva- normal   lids- normal  EARS/NOSE- TM's normal, nasopharynx- normal and atraumatic  OROPHARYNX- normal  NECK- supple, FROM  THYROID- NT, normal size, no nodule noted  LYMPH- no cervical lymph nodes palpated   CV- RRR without murmur  PULM- CTA bilaterally, normal respiratory effort  RESPIRATORY EFFORT- normal , no retractions or nasal flaring   ABD- normoactive BS's , soft , NT, no hepatosplenomegaly palpated  EXT- no edema,NT  SKIN- no abnormal skin lesions noted  NEURO- no focal deficits  PSYCH- pleasant, normal judgement and insight    BP Readings from Last 3 Encounters:   02/13/24 121/75   08/11/23 125/81   02/07/23 132/89         Wt Readings from Last 3 Encounters:   02/13/24 83.4 kg (183 lb 12.8 oz)   08/11/23 88.2 kg (194 lb 8 oz)   02/07/23 95.8 kg (211 lb 4.8 oz)         BMI Readings from Last 3 Encounters:   02/13/24 30.12 kg/m²   08/11/23 30.46 kg/m²   02/07/23 33.09 kg/m²       The number and complexity of problems addressed is considered moderate.  The amount and/or complexity of data reviewed and analyzed is considered moderate. The risk of complications and/or morbidity/mortality of patient is considered moderate. Overall, this patient encounter is considered a moderate risk visit.        Assessment/Plan      Problem List Items Addressed This Visit          Active Problems    Breast CA (CMS/HCC) (Chronic)    Class 1 obesity due to excess calories with body mass index (BMI) of 30.0 to 30.9 in adult (Chronic)    Encounter for hepatitis C screening test for low risk patient (Chronic)    Relevant Orders    Hepatitis C Antibody    History of mastectomy, total    Prediabetes    Relevant Orders    Hemoglobin A1C    Rosacea (Chronic)    Relevant Orders    Referral to Dermatology    Screen for colon cancer (Chronic)    " Relevant Orders    Colonoscopy Screening; Average Risk Patient    Screening for cholesterol level (Chronic)    Relevant Orders    Lipid Panel    Screening for HIV (human immunodeficiency virus) (Chronic)    Relevant Orders    HIV 1/2 Antigen/Antibody Screen with Reflex to Confirmation    Well adult exam - Primary (Chronic)    Relevant Orders    Follow Up In [facility] - PCP - Established       Orders Placed This Encounter   Procedures    Lipid Panel    Hepatitis C Antibody    HIV 1/2 Antigen/Antibody Screen with Reflex to Confirmation    Hemoglobin A1C    Referral to Dermatology             Follow up 12 months              Recommendations for women annual wellness exam:   Make sure screenings for cervical and breast cancer are up to date if applicable- pap smears age 21-65  mammogram starting at age 35- 40 or sooner if positive family history of breast cancer   colonoscopy at age 45, earlier if positive family history for breast or colon cancer   Screen for osteoporosis with DXA bone scan starting at age 65 or sooner if risk factors present (long term steroid use, smoking, heavy alcohol use, history of fracture, rheumatoid arthritis, low body weight, family history of hip fracture)  Screening for lung cancer with low-dose CT in all adults age 50-80 who have a 20 pack-year smoking history and currently smoke or have quit within the past 15 years; and are symptom free/no prior pulmonary diagnosis  Gardisil vaccine age 9-26 years of age   Follow a healthy diet (Examples, Dash diet, Mediterranean diet)  Exercise 150 minutes per week   Maintain healthy weight (BMI < 25)  Do not smoke   Alcohol in moderation (up to 1 drink/day)  Get enough sleep (7-8 hours/night)  Take a prenatal vitamin with folic acid if possibility of pregnancy   Make sure immunizations are up to date   Recommend minimum 1,000 mg calcium and 600-800 IU vitamin D daily (combination of diet + supplement)- unless there is a contraindication  n/a   Visit dentist twice yearly      If you are less than 60 years old, have diabetes mellitus, or chronic kidney disease, your goal blood pressure is < 140/90.  If you are older than 60 years old and do not have diabetes or kidney disease, your goal blood pressure is < 150/90.

## 2025-07-02 ENCOUNTER — HOSPITAL ENCOUNTER (OUTPATIENT)
Dept: RADIOLOGY | Facility: HOSPITAL | Age: 50
Discharge: HOME | End: 2025-07-02
Payer: COMMERCIAL

## 2025-07-02 DIAGNOSIS — R07.9 CHEST PAIN, UNSPECIFIED TYPE: ICD-10-CM

## 2025-07-02 PROCEDURE — 75571 CT HRT W/O DYE W/CA TEST: CPT

## 2025-07-11 ENCOUNTER — TELEPHONE (OUTPATIENT)
Dept: CARDIOLOGY | Facility: CLINIC | Age: 50
End: 2025-07-11
Payer: COMMERCIAL

## 2025-07-11 NOTE — TELEPHONE ENCOUNTER
----- Message from Giacomo Valenzuela sent at 7/11/2025 11:55 AM EDT -----  Please let patient know that her CT cardiac scoring was normal.  ----- Message -----  From: Interface, Radiology Results In  Sent: 7/7/2025   7:49 AM EDT  To: Giacomo Valenzuela MD

## 2025-11-18 ENCOUNTER — APPOINTMENT (OUTPATIENT)
Dept: OBSTETRICS AND GYNECOLOGY | Facility: CLINIC | Age: 50
End: 2025-11-18
Payer: COMMERCIAL

## 2026-04-30 ENCOUNTER — APPOINTMENT (OUTPATIENT)
Dept: DERMATOLOGY | Facility: CLINIC | Age: 51
End: 2026-04-30
Payer: COMMERCIAL